# Patient Record
Sex: FEMALE | Race: WHITE | URBAN - METROPOLITAN AREA
[De-identification: names, ages, dates, MRNs, and addresses within clinical notes are randomized per-mention and may not be internally consistent; named-entity substitution may affect disease eponyms.]

---

## 2019-10-24 ENCOUNTER — HISTORICAL (OUTPATIENT)
Dept: PREADMISSION TESTING | Facility: HOSPITAL | Age: 69
End: 2019-10-24

## 2019-10-24 LAB
ABS NEUT (OLG): 3.76 X10(3)/MCL (ref 2.1–9.2)
ALBUMIN SERPL-MCNC: 4 GM/DL (ref 3.4–5)
ALBUMIN/GLOB SERPL: 1 RATIO (ref 1.1–2)
ALP SERPL-CCNC: 59 UNIT/L (ref 38–126)
ALT SERPL-CCNC: 30 UNIT/L (ref 12–78)
APTT PPP: 29.4 SECOND(S) (ref 24.2–33.9)
AST SERPL-CCNC: 23 UNIT/L (ref 15–37)
BASOPHILS # BLD AUTO: 0 X10(3)/MCL (ref 0–0.2)
BASOPHILS NFR BLD AUTO: 1 %
BILIRUB SERPL-MCNC: 0.3 MG/DL (ref 0.2–1)
BILIRUBIN DIRECT+TOT PNL SERPL-MCNC: 0.1 MG/DL (ref 0–0.5)
BILIRUBIN DIRECT+TOT PNL SERPL-MCNC: 0.2 MG/DL (ref 0–0.8)
BUN SERPL-MCNC: 15 MG/DL (ref 7–18)
CALCIUM SERPL-MCNC: 9.3 MG/DL (ref 8.5–10.1)
CHLORIDE SERPL-SCNC: 102 MMOL/L (ref 98–107)
CO2 SERPL-SCNC: 33 MMOL/L (ref 21–32)
CREAT SERPL-MCNC: 1 MG/DL (ref 0.55–1.02)
EOSINOPHIL # BLD AUTO: 0.2 X10(3)/MCL (ref 0–0.9)
EOSINOPHIL NFR BLD AUTO: 3 %
ERYTHROCYTE [DISTWIDTH] IN BLOOD BY AUTOMATED COUNT: 12.3 % (ref 11.5–17)
GLOBULIN SER-MCNC: 3.9 GM/DL (ref 2.4–3.5)
GLUCOSE SERPL-MCNC: 127 MG/DL (ref 74–106)
HCT VFR BLD AUTO: 38.9 % (ref 37–47)
HGB BLD-MCNC: 12.7 GM/DL (ref 12–16)
INR PPP: 1 (ref 0–1.3)
LYMPHOCYTES # BLD AUTO: 2.6 X10(3)/MCL (ref 0.6–4.6)
LYMPHOCYTES NFR BLD AUTO: 36 %
MCH RBC QN AUTO: 30 PG (ref 27–31)
MCHC RBC AUTO-ENTMCNC: 32.6 GM/DL (ref 33–36)
MCV RBC AUTO: 91.7 FL (ref 80–94)
MONOCYTES # BLD AUTO: 0.4 X10(3)/MCL (ref 0.1–1.3)
MONOCYTES NFR BLD AUTO: 5 %
NEUTROPHILS # BLD AUTO: 3.76 X10(3)/MCL (ref 2.1–9.2)
NEUTROPHILS NFR BLD AUTO: 54 %
PLATELET # BLD AUTO: 301 X10(3)/MCL (ref 130–400)
PMV BLD AUTO: 9.3 FL (ref 9.4–12.4)
POTASSIUM SERPL-SCNC: 3.6 MMOL/L (ref 3.5–5.1)
PROT SERPL-MCNC: 7.9 GM/DL (ref 6.4–8.2)
PROTHROMBIN TIME: 13.3 SECOND(S) (ref 12–14)
RBC # BLD AUTO: 4.24 X10(6)/MCL (ref 4.2–5.4)
SODIUM SERPL-SCNC: 139 MMOL/L (ref 136–145)
WBC # SPEC AUTO: 7 X10(3)/MCL (ref 4.5–11.5)

## 2019-11-08 ENCOUNTER — HISTORICAL (OUTPATIENT)
Dept: ADMINISTRATIVE | Facility: HOSPITAL | Age: 69
End: 2019-11-08

## 2024-04-04 ENCOUNTER — HOSPITAL ENCOUNTER (INPATIENT)
Facility: HOSPITAL | Age: 74
LOS: 3 days | Discharge: HOME | DRG: 641 | End: 2024-04-07
Attending: FAMILY MEDICINE | Admitting: INTERNAL MEDICINE
Payer: MEDICARE

## 2024-04-04 DIAGNOSIS — R07.9 CHEST PAIN: ICD-10-CM

## 2024-04-04 DIAGNOSIS — R07.89 CHEST TIGHTNESS: ICD-10-CM

## 2024-04-04 DIAGNOSIS — E87.1 HYPONATREMIA: Primary | ICD-10-CM

## 2024-04-04 PROBLEM — I10 HTN (HYPERTENSION): Status: ACTIVE | Noted: 2024-04-04

## 2024-04-04 PROBLEM — I48.0 PAROXYSMAL A-FIB: Status: ACTIVE | Noted: 2024-04-04

## 2024-04-04 PROBLEM — E03.9 HYPOTHYROIDISM: Status: ACTIVE | Noted: 2024-04-04

## 2024-04-04 LAB
ALBUMIN SERPL-MCNC: 5.1 G/DL (ref 3.4–5)
ALBUMIN/GLOB SERPL: 1.4 RATIO
ALP SERPL-CCNC: 64 UNIT/L (ref 50–144)
ALT SERPL-CCNC: 36 UNIT/L (ref 1–45)
ANION GAP SERPL CALC-SCNC: 10 MEQ/L (ref 2–13)
AST SERPL-CCNC: 45 UNIT/L (ref 14–36)
BASOPHILS # BLD AUTO: 0.02 X10(3)/MCL (ref 0.01–0.08)
BASOPHILS NFR BLD AUTO: 0.3 % (ref 0.1–1.2)
BILIRUB SERPL-MCNC: 0.7 MG/DL (ref 0–1)
BNP BLD-MCNC: 196 PG/ML (ref 0–124.9)
BUN SERPL-MCNC: 17 MG/DL (ref 7–20)
CALCIUM SERPL-MCNC: 10.1 MG/DL (ref 8.4–10.2)
CHLORIDE SERPL-SCNC: 76 MMOL/L (ref 98–110)
CO2 SERPL-SCNC: 29 MMOL/L (ref 21–32)
CREAT SERPL-MCNC: 1.04 MG/DL (ref 0.66–1.25)
CREAT/UREA NIT SERPL: 16 (ref 12–20)
EOSINOPHIL # BLD AUTO: 0.09 X10(3)/MCL (ref 0.04–0.36)
EOSINOPHIL NFR BLD AUTO: 1.2 % (ref 0.7–7)
ERYTHROCYTE [DISTWIDTH] IN BLOOD BY AUTOMATED COUNT: 12.1 % (ref 11–14.5)
GFR SERPLBLD CREATININE-BSD FMLA CKD-EPI: 56 MLS/MIN/1.73/M2
GLOBULIN SER-MCNC: 3.6 GM/DL (ref 2–3.9)
GLUCOSE SERPL-MCNC: 134 MG/DL (ref 70–115)
HCT VFR BLD AUTO: 37.6 % (ref 36–48)
HGB BLD-MCNC: 13.8 G/DL (ref 11.8–16)
IMM GRANULOCYTES # BLD AUTO: 0.02 X10(3)/MCL (ref 0–0.03)
IMM GRANULOCYTES NFR BLD AUTO: 0.3 % (ref 0–0.5)
LYMPHOCYTES # BLD AUTO: 2.98 X10(3)/MCL (ref 1.16–3.74)
LYMPHOCYTES NFR BLD AUTO: 39.7 % (ref 20–55)
MAGNESIUM SERPL-MCNC: 1.9 MG/DL (ref 1.8–2.4)
MCH RBC QN AUTO: 29.1 PG (ref 27–34)
MCHC RBC AUTO-ENTMCNC: 36.7 G/DL (ref 31–37)
MCV RBC AUTO: 79.3 FL (ref 79–99)
MONOCYTES # BLD AUTO: 0.65 X10(3)/MCL (ref 0.24–0.36)
MONOCYTES NFR BLD AUTO: 8.7 % (ref 4.7–12.5)
NEUTROPHILS # BLD AUTO: 3.74 X10(3)/MCL (ref 1.56–6.13)
NEUTROPHILS NFR BLD AUTO: 49.8 % (ref 37–73)
PLATELET # BLD AUTO: 386 X10(3)/MCL (ref 140–371)
PMV BLD AUTO: 8.9 FL (ref 9.4–12.4)
POTASSIUM SERPL-SCNC: 3.5 MMOL/L (ref 3.5–5.1)
PROT SERPL-MCNC: 8.7 GM/DL (ref 6.3–8.2)
RBC # BLD AUTO: 4.74 X10(6)/MCL (ref 4–5.1)
SODIUM SERPL-SCNC: 115 MMOL/L (ref 135–145)
TROPONIN I SERPL-MCNC: <0.012 NG/ML (ref 0–0.03)
TROPONIN I SERPL-MCNC: <0.012 NG/ML (ref 0–0.03)
WBC # SPEC AUTO: 7.5 X10(3)/MCL (ref 4–11.5)

## 2024-04-04 PROCEDURE — 83930 ASSAY OF BLOOD OSMOLALITY: CPT | Performed by: INTERNAL MEDICINE

## 2024-04-04 PROCEDURE — 11000001 HC ACUTE MED/SURG PRIVATE ROOM

## 2024-04-04 PROCEDURE — 25000003 PHARM REV CODE 250: Performed by: FAMILY MEDICINE

## 2024-04-04 PROCEDURE — 83935 ASSAY OF URINE OSMOLALITY: CPT | Performed by: INTERNAL MEDICINE

## 2024-04-04 PROCEDURE — 96374 THER/PROPH/DIAG INJ IV PUSH: CPT

## 2024-04-04 PROCEDURE — 96361 HYDRATE IV INFUSION ADD-ON: CPT

## 2024-04-04 PROCEDURE — 93010 ELECTROCARDIOGRAM REPORT: CPT | Mod: ,,, | Performed by: INTERNAL MEDICINE

## 2024-04-04 PROCEDURE — 99285 EMERGENCY DEPT VISIT HI MDM: CPT | Mod: 25

## 2024-04-04 PROCEDURE — 84300 ASSAY OF URINE SODIUM: CPT | Performed by: INTERNAL MEDICINE

## 2024-04-04 PROCEDURE — 83735 ASSAY OF MAGNESIUM: CPT | Performed by: FAMILY MEDICINE

## 2024-04-04 PROCEDURE — 63600175 PHARM REV CODE 636 W HCPCS: Performed by: FAMILY MEDICINE

## 2024-04-04 PROCEDURE — 84484 ASSAY OF TROPONIN QUANT: CPT | Performed by: FAMILY MEDICINE

## 2024-04-04 PROCEDURE — 63600175 PHARM REV CODE 636 W HCPCS: Performed by: INTERNAL MEDICINE

## 2024-04-04 PROCEDURE — 80053 COMPREHEN METABOLIC PANEL: CPT | Performed by: FAMILY MEDICINE

## 2024-04-04 PROCEDURE — 25000242 PHARM REV CODE 250 ALT 637 W/ HCPCS: Performed by: FAMILY MEDICINE

## 2024-04-04 PROCEDURE — 85025 COMPLETE CBC W/AUTO DIFF WBC: CPT | Performed by: FAMILY MEDICINE

## 2024-04-04 PROCEDURE — 83880 ASSAY OF NATRIURETIC PEPTIDE: CPT | Performed by: FAMILY MEDICINE

## 2024-04-04 PROCEDURE — 93005 ELECTROCARDIOGRAM TRACING: CPT

## 2024-04-04 RX ORDER — ACETAMINOPHEN 325 MG/1
650 TABLET ORAL EVERY 4 HOURS PRN
Status: DISCONTINUED | OUTPATIENT
Start: 2024-04-04 | End: 2024-04-07 | Stop reason: HOSPADM

## 2024-04-04 RX ORDER — OLMESARTAN MEDOXOMIL 5 MG/1
20 TABLET ORAL 2 TIMES DAILY
Status: ON HOLD | COMMUNITY
Start: 2024-02-15 | End: 2024-04-04

## 2024-04-04 RX ORDER — NITROGLYCERIN 0.4 MG/1
0.4 TABLET SUBLINGUAL EVERY 5 MIN PRN
Status: DISCONTINUED | OUTPATIENT
Start: 2024-04-04 | End: 2024-04-07 | Stop reason: HOSPADM

## 2024-04-04 RX ORDER — BUSPIRONE HYDROCHLORIDE 5 MG/1
5 TABLET ORAL DAILY
Status: ON HOLD | COMMUNITY
Start: 2024-04-01 | End: 2024-04-04

## 2024-04-04 RX ORDER — ASPIRIN 325 MG
325 TABLET ORAL
Status: COMPLETED | OUTPATIENT
Start: 2024-04-04 | End: 2024-04-04

## 2024-04-04 RX ORDER — AMLODIPINE BESYLATE 5 MG/1
2.5 TABLET ORAL NIGHTLY
Status: ON HOLD | COMMUNITY
Start: 2024-03-14 | End: 2024-04-07 | Stop reason: HOSPADM

## 2024-04-04 RX ORDER — SODIUM CHLORIDE 0.9 % (FLUSH) 0.9 %
10 SYRINGE (ML) INJECTION EVERY 12 HOURS PRN
Status: DISCONTINUED | OUTPATIENT
Start: 2024-04-04 | End: 2024-04-07 | Stop reason: HOSPADM

## 2024-04-04 RX ORDER — HYDROCHLOROTHIAZIDE 12.5 MG/1
25 TABLET ORAL DAILY
Status: CANCELLED | OUTPATIENT
Start: 2024-04-05

## 2024-04-04 RX ORDER — AMIODARONE HYDROCHLORIDE 200 MG/1
200 TABLET ORAL 2 TIMES DAILY
Status: DISCONTINUED | OUTPATIENT
Start: 2024-04-04 | End: 2024-04-06

## 2024-04-04 RX ORDER — HYDROCHLOROTHIAZIDE 25 MG/1
12.5 TABLET ORAL 2 TIMES DAILY
Status: ON HOLD | COMMUNITY
Start: 2024-03-08 | End: 2024-04-07 | Stop reason: HOSPADM

## 2024-04-04 RX ORDER — NITROGLYCERIN 0.4 MG/1
0.4 TABLET SUBLINGUAL
Status: COMPLETED | OUTPATIENT
Start: 2024-04-04 | End: 2024-04-04

## 2024-04-04 RX ORDER — ALPRAZOLAM 0.25 MG/1
0.25 TABLET ORAL NIGHTLY
COMMUNITY

## 2024-04-04 RX ORDER — AMLODIPINE BESYLATE 2.5 MG/1
2.5 TABLET ORAL NIGHTLY
Status: CANCELLED | OUTPATIENT
Start: 2024-04-04

## 2024-04-04 RX ORDER — LEVOTHYROXINE SODIUM 88 UG/1
88 TABLET ORAL
COMMUNITY

## 2024-04-04 RX ORDER — SODIUM CHLORIDE 9 MG/ML
1000 INJECTION, SOLUTION INTRAVENOUS
Status: DISCONTINUED | OUTPATIENT
Start: 2024-04-04 | End: 2024-04-04

## 2024-04-04 RX ORDER — NALOXONE HCL 0.4 MG/ML
0.02 VIAL (ML) INJECTION
Status: DISCONTINUED | OUTPATIENT
Start: 2024-04-04 | End: 2024-04-07 | Stop reason: HOSPADM

## 2024-04-04 RX ORDER — LOSARTAN POTASSIUM 25 MG/1
25 TABLET ORAL DAILY
Status: CANCELLED | OUTPATIENT
Start: 2024-04-05

## 2024-04-04 RX ORDER — LEVOTHYROXINE SODIUM 88 UG/1
88 TABLET ORAL
Status: DISCONTINUED | OUTPATIENT
Start: 2024-04-05 | End: 2024-04-07 | Stop reason: HOSPADM

## 2024-04-04 RX ORDER — METOPROLOL SUCCINATE 25 MG/1
25 TABLET, EXTENDED RELEASE ORAL NIGHTLY
Status: ON HOLD | COMMUNITY
Start: 2024-02-15 | End: 2024-04-07 | Stop reason: HOSPADM

## 2024-04-04 RX ORDER — BUSPIRONE HYDROCHLORIDE 5 MG/1
5 TABLET ORAL DAILY
Status: DISCONTINUED | OUTPATIENT
Start: 2024-04-05 | End: 2024-04-07 | Stop reason: HOSPADM

## 2024-04-04 RX ORDER — METOPROLOL SUCCINATE 25 MG/1
25 TABLET, EXTENDED RELEASE ORAL NIGHTLY
Status: CANCELLED | OUTPATIENT
Start: 2024-04-04

## 2024-04-04 RX ORDER — APIXABAN 5 MG/1
5 TABLET, FILM COATED ORAL 2 TIMES DAILY
COMMUNITY
Start: 2024-03-12

## 2024-04-04 RX ORDER — ONDANSETRON HYDROCHLORIDE 2 MG/ML
4 INJECTION, SOLUTION INTRAVENOUS EVERY 8 HOURS PRN
Status: DISCONTINUED | OUTPATIENT
Start: 2024-04-04 | End: 2024-04-07 | Stop reason: HOSPADM

## 2024-04-04 RX ORDER — ACETAMINOPHEN 325 MG/1
650 TABLET ORAL EVERY 8 HOURS PRN
Status: DISCONTINUED | OUTPATIENT
Start: 2024-04-04 | End: 2024-04-07 | Stop reason: HOSPADM

## 2024-04-04 RX ORDER — OLMESARTAN MEDOXOMIL 20 MG/1
20 TABLET ORAL 2 TIMES DAILY
Status: ON HOLD | COMMUNITY
End: 2024-04-07

## 2024-04-04 RX ORDER — HYDROMORPHONE HYDROCHLORIDE 1 MG/ML
0.5 INJECTION, SOLUTION INTRAMUSCULAR; INTRAVENOUS; SUBCUTANEOUS
Status: COMPLETED | OUTPATIENT
Start: 2024-04-04 | End: 2024-04-04

## 2024-04-04 RX ORDER — AMIODARONE HYDROCHLORIDE 200 MG/1
200 TABLET ORAL DAILY
Status: ON HOLD | COMMUNITY
Start: 2024-02-02 | End: 2024-04-07 | Stop reason: HOSPADM

## 2024-04-04 RX ADMIN — NITROGLYCERIN 0.4 MG: 0.4 TABLET SUBLINGUAL at 08:04

## 2024-04-04 RX ADMIN — NITROGLYCERIN 0.4 MG: 0.4 TABLET SUBLINGUAL at 07:04

## 2024-04-04 RX ADMIN — SODIUM CHLORIDE 1000 ML: 9 INJECTION, SOLUTION INTRAVENOUS at 07:04

## 2024-04-04 RX ADMIN — ONDANSETRON 4 MG: 2 INJECTION INTRAMUSCULAR; INTRAVENOUS at 11:04

## 2024-04-04 RX ADMIN — HYDROMORPHONE HYDROCHLORIDE 0.5 MG: 1 INJECTION, SOLUTION INTRAMUSCULAR; INTRAVENOUS; SUBCUTANEOUS at 08:04

## 2024-04-04 RX ADMIN — SODIUM CHLORIDE 1000 ML: 9 INJECTION, SOLUTION INTRAVENOUS at 08:04

## 2024-04-04 RX ADMIN — ASPIRIN 325 MG ORAL TABLET 325 MG: 325 PILL ORAL at 07:04

## 2024-04-04 NOTE — ED PROVIDER NOTES
Encounter Date: 4/4/2024       History     Chief Complaint   Patient presents with    Chest Pain     Pt presented to ED per POV with c/o midsternal chest pain starting this morning. Pt denies chest pain right now.      Patient presents for evaluation of chest pain.  Patient notes having chest pain since this afternoon.  Pain is moderate aching pain that has been episodic lasting hours and sometimes improving slightly.  Patient describes the pain as pressure-like.  Patient denies having fevers chills cough congestion.  Patient notes having a recent history of ablation for atrial flutter on March 18th.  Patient denies having any relieving features.  Patient also notes having recent adjustment in her blood pressure medicine and has been having periods of hypotension when taking the new medication.    The history is provided by the patient.     Review of patient's allergies indicates:  No Known Allergies  Past Medical History:   Diagnosis Date    A-fib     Hypertension      Past Surgical History:   Procedure Laterality Date    ABLATION OF DYSRHYTHMIC FOCUS       History reviewed. No pertinent family history.  Social History     Tobacco Use    Smoking status: Never    Smokeless tobacco: Never   Substance Use Topics    Alcohol use: Not Currently    Drug use: Never     Review of Systems   HENT: Negative.     Eyes: Negative.    Respiratory: Negative.     Cardiovascular:  Positive for chest pain.   Gastrointestinal: Negative.    Endocrine: Negative.    Genitourinary: Negative.    Musculoskeletal: Negative.    Skin: Negative.    Allergic/Immunologic: Negative.    Neurological: Negative.    Psychiatric/Behavioral: Negative.         Physical Exam     Initial Vitals [04/04/24 1840]   BP Pulse Resp Temp SpO2   (!) 187/78 67 20 97.8 °F (36.6 °C) 100 %      MAP       --         Physical Exam    Vitals reviewed.  Constitutional: She appears well-developed and well-nourished. She is not diaphoretic. No distress.   HENT:   Head:  Normocephalic and atraumatic.   Mouth/Throat: No oropharyngeal exudate.   Eyes: EOM are normal. Pupils are equal, round, and reactive to light. Right eye exhibits no discharge. Left eye exhibits no discharge.   Neck: Neck supple. No thyromegaly present. No tracheal deviation present.   Normal range of motion.  Cardiovascular:  Normal rate and regular rhythm.     Exam reveals no friction rub.       No murmur heard.  Pulmonary/Chest: Breath sounds normal. No stridor. No respiratory distress. She has no wheezes. She has no rhonchi.   Abdominal: Abdomen is soft. Bowel sounds are normal. She exhibits no distension. There is no abdominal tenderness. There is no rebound.   Musculoskeletal:         General: No tenderness or edema. Normal range of motion.      Cervical back: Normal range of motion and neck supple.     Neurological: She is alert and oriented to person, place, and time. She has normal reflexes. She displays normal reflexes. No cranial nerve deficit. GCS score is 15. GCS eye subscore is 4. GCS verbal subscore is 5. GCS motor subscore is 6.   Skin: Skin is warm. No rash noted. No erythema.   Psychiatric: She has a normal mood and affect.         ED Course   Procedures  Labs Reviewed   COMPREHENSIVE METABOLIC PANEL - Abnormal; Notable for the following components:       Result Value    Sodium Level 115 (*)     Chloride 76 (*)     Glucose Level 134 (*)     Protein Total 8.7 (*)     Albumin Level 5.1 (*)     Aspartate Aminotransferase 45 (*)     All other components within normal limits   NT-PRO NATRIURETIC PEPTIDE - Abnormal; Notable for the following components:    ProBNP 196.0 (*)     All other components within normal limits   CBC WITH DIFFERENTIAL - Abnormal; Notable for the following components:    Platelet 386 (*)     MPV 8.9 (*)     Mono # 0.65 (*)     All other components within normal limits   MAGNESIUM - Normal   TROPONIN I - Normal   TROPONIN I - Normal   CBC W/ AUTO DIFFERENTIAL    Narrative:     The  following orders were created for panel order CBC auto differential.  Procedure                               Abnormality         Status                     ---------                               -----------         ------                     CBC with Differential[[id number]]       Abnormal            Final result                 Please view results for these tests on the individual orders.     EKG Readings: (Independently Interpreted)   Initial Reading: No STEMI. ST Segments: Normal ST Segments. T Waves: Normal.   Normal sinus rhythm at 67 beats per minute.  Nonspecific ST-T changes present.       Imaging Results              X-Ray Chest 1 View (Final result)  Result time 04/04/24 20:22:30      Final result by Indra Alonso MD (04/04/24 20:22:30)                   Impression:      No radiographic evidence of an acute cardiopulmonary process.      Electronically signed by: Indra Alonso  Date:    04/04/2024  Time:    20:22               Narrative:    EXAMINATION:  XR CHEST 1 VIEW    CLINICAL HISTORY:  Chest pain, unspecified    COMPARISON:  None    FINDINGS:  Single AP chest radiograph is provided for evaluation.    Cardiac silhouette is normal in size.    Stable appearance of prominent interstitial lung markings.    No focal consolidation, pneumothorax, or pleural effusion.    No acute osseous findings.                                       Medications   nitroGLYCERIN SL tablet 0.4 mg (0.4 mg Sublingual Given 4/4/24 1926)   sodium chloride 0.9% bolus 1,000 mL 1,000 mL (1,000 mLs Intravenous New Bag 4/4/24 2055)   aspirin tablet 325 mg (325 mg Oral Given 4/4/24 1926)   sodium chloride 0.9% bolus 1,000 mL 1,000 mL (0 mLs Intravenous Stopped 4/4/24 2026)   HYDROmorphone injection 0.5 mg (0.5 mg Intravenous Given 4/4/24 2030)   nitroGLYCERIN SL tablet 0.4 mg (0.4 mg Sublingual Given 4/4/24 2034)     Medical Decision Making  Amount and/or Complexity of Data Reviewed  Labs: ordered.  Radiology: ordered.    Risk  OTC  drugs.  Prescription drug management.  Decision regarding hospitalization.                                      Clinical Impression:  Final diagnoses:  [R07.9] Chest pain  [E87.1] Hyponatremia (Primary)          ED Disposition Condition    Admit Stable                Fernando Bardales MD  04/04/24 4699

## 2024-04-05 LAB
ANION GAP SERPL CALC-SCNC: 5 MEQ/L (ref 2–13)
ANION GAP SERPL CALC-SCNC: 6 MEQ/L (ref 2–13)
ANION GAP SERPL CALC-SCNC: 7 MEQ/L (ref 2–13)
ANION GAP SERPL CALC-SCNC: 8 MEQ/L (ref 2–13)
BASOPHILS # BLD AUTO: 0.01 X10(3)/MCL (ref 0.01–0.08)
BASOPHILS NFR BLD AUTO: 0.2 % (ref 0.1–1.2)
BUN SERPL-MCNC: 10 MG/DL (ref 7–20)
BUN SERPL-MCNC: 11 MG/DL (ref 7–20)
CALCIUM SERPL-MCNC: 8.8 MG/DL (ref 8.4–10.2)
CALCIUM SERPL-MCNC: 9.3 MG/DL (ref 8.4–10.2)
CALCIUM SERPL-MCNC: 9.6 MG/DL (ref 8.4–10.2)
CALCIUM SERPL-MCNC: 9.7 MG/DL (ref 8.4–10.2)
CHLORIDE SERPL-SCNC: 84 MMOL/L (ref 98–110)
CHLORIDE SERPL-SCNC: 88 MMOL/L (ref 98–110)
CHLORIDE SERPL-SCNC: 89 MMOL/L (ref 98–110)
CHLORIDE SERPL-SCNC: 90 MMOL/L (ref 98–110)
CO2 SERPL-SCNC: 29 MMOL/L (ref 21–32)
CO2 SERPL-SCNC: 29 MMOL/L (ref 21–32)
CO2 SERPL-SCNC: 30 MMOL/L (ref 21–32)
CO2 SERPL-SCNC: 31 MMOL/L (ref 21–32)
CREAT SERPL-MCNC: 0.79 MG/DL (ref 0.66–1.25)
CREAT SERPL-MCNC: 0.82 MG/DL (ref 0.66–1.25)
CREAT SERPL-MCNC: 0.83 MG/DL (ref 0.66–1.25)
CREAT SERPL-MCNC: 0.84 MG/DL (ref 0.66–1.25)
CREAT/UREA NIT SERPL: 12 (ref 12–20)
CREAT/UREA NIT SERPL: 12 (ref 12–20)
CREAT/UREA NIT SERPL: 13 (ref 12–20)
CREAT/UREA NIT SERPL: 13 (ref 12–20)
EOSINOPHIL # BLD AUTO: 0.02 X10(3)/MCL (ref 0.04–0.36)
EOSINOPHIL NFR BLD AUTO: 0.4 % (ref 0.7–7)
ERYTHROCYTE [DISTWIDTH] IN BLOOD BY AUTOMATED COUNT: 12.1 % (ref 11–14.5)
GFR SERPLBLD CREATININE-BSD FMLA CKD-EPI: 73 MLS/MIN/1.73/M2
GFR SERPLBLD CREATININE-BSD FMLA CKD-EPI: 74 MLS/MIN/1.73/M2
GFR SERPLBLD CREATININE-BSD FMLA CKD-EPI: 75 MLS/MIN/1.73/M2
GFR SERPLBLD CREATININE-BSD FMLA CKD-EPI: 78 MLS/MIN/1.73/M2
GLUCOSE SERPL-MCNC: 105 MG/DL (ref 70–115)
GLUCOSE SERPL-MCNC: 108 MG/DL (ref 70–115)
GLUCOSE SERPL-MCNC: 109 MG/DL (ref 70–115)
GLUCOSE SERPL-MCNC: 128 MG/DL (ref 70–115)
HCT VFR BLD AUTO: 31.5 % (ref 36–48)
HGB BLD-MCNC: 11.6 G/DL (ref 11.8–16)
IMM GRANULOCYTES # BLD AUTO: 0.01 X10(3)/MCL (ref 0–0.03)
IMM GRANULOCYTES NFR BLD AUTO: 0.2 % (ref 0–0.5)
LYMPHOCYTES # BLD AUTO: 1.33 X10(3)/MCL (ref 1.16–3.74)
LYMPHOCYTES NFR BLD AUTO: 26.9 % (ref 20–55)
MCH RBC QN AUTO: 29.4 PG (ref 27–34)
MCHC RBC AUTO-ENTMCNC: 36.8 G/DL (ref 31–37)
MCV RBC AUTO: 79.7 FL (ref 79–99)
MONOCYTES # BLD AUTO: 0.37 X10(3)/MCL (ref 0.24–0.36)
MONOCYTES NFR BLD AUTO: 7.5 % (ref 4.7–12.5)
NEUTROPHILS # BLD AUTO: 3.21 X10(3)/MCL (ref 1.56–6.13)
NEUTROPHILS NFR BLD AUTO: 64.8 % (ref 37–73)
OHS QRS DURATION: 114 MS
OHS QTC CALCULATION: 460 MS
OSMOLALITY SERPL: 250 MOSM/KG (ref 280–300)
OSMOLALITY UR: 163 MOSM/KG (ref 300–1300)
PLATELET # BLD AUTO: 290 X10(3)/MCL (ref 140–371)
PMV BLD AUTO: 8.9 FL (ref 9.4–12.4)
POTASSIUM SERPL-SCNC: 3.4 MMOL/L (ref 3.5–5.1)
POTASSIUM SERPL-SCNC: 3.5 MMOL/L (ref 3.5–5.1)
POTASSIUM SERPL-SCNC: 3.5 MMOL/L (ref 3.5–5.1)
POTASSIUM SERPL-SCNC: 3.6 MMOL/L (ref 3.5–5.1)
RBC # BLD AUTO: 3.95 X10(6)/MCL (ref 4–5.1)
SODIUM SERPL-SCNC: 119 MMOL/L (ref 135–145)
SODIUM SERPL-SCNC: 124 MMOL/L (ref 135–145)
SODIUM SERPL-SCNC: 126 MMOL/L (ref 135–145)
SODIUM SERPL-SCNC: 127 MMOL/L (ref 135–145)
SODIUM UR-SCNC: 45 MMOL/L
TROPONIN I SERPL-MCNC: 0.01 NG/ML (ref 0–0.03)
TSH SERPL-ACNC: 0.7 UIU/ML (ref 0.36–3.74)
WBC # SPEC AUTO: 4.95 X10(3)/MCL (ref 4–11.5)

## 2024-04-05 PROCEDURE — 36415 COLL VENOUS BLD VENIPUNCTURE: CPT | Performed by: INTERNAL MEDICINE

## 2024-04-05 PROCEDURE — 85025 COMPLETE CBC W/AUTO DIFF WBC: CPT | Performed by: INTERNAL MEDICINE

## 2024-04-05 PROCEDURE — 25000003 PHARM REV CODE 250: Performed by: INTERNAL MEDICINE

## 2024-04-05 PROCEDURE — 84443 ASSAY THYROID STIM HORMONE: CPT | Performed by: INTERNAL MEDICINE

## 2024-04-05 PROCEDURE — 25000003 PHARM REV CODE 250: Performed by: FAMILY MEDICINE

## 2024-04-05 PROCEDURE — 93005 ELECTROCARDIOGRAM TRACING: CPT

## 2024-04-05 PROCEDURE — 93010 ELECTROCARDIOGRAM REPORT: CPT | Mod: ,,, | Performed by: INTERNAL MEDICINE

## 2024-04-05 PROCEDURE — 11000001 HC ACUTE MED/SURG PRIVATE ROOM

## 2024-04-05 PROCEDURE — 80048 BASIC METABOLIC PNL TOTAL CA: CPT | Performed by: INTERNAL MEDICINE

## 2024-04-05 PROCEDURE — 84484 ASSAY OF TROPONIN QUANT: CPT | Performed by: INTERNAL MEDICINE

## 2024-04-05 RX ORDER — LIDOCAINE HYDROCHLORIDE 20 MG/ML
10 SOLUTION OROPHARYNGEAL ONCE
Status: COMPLETED | OUTPATIENT
Start: 2024-04-05 | End: 2024-04-05

## 2024-04-05 RX ORDER — ALUMINUM HYDROXIDE, MAGNESIUM HYDROXIDE, AND SIMETHICONE 1200; 120; 1200 MG/30ML; MG/30ML; MG/30ML
15 SUSPENSION ORAL EVERY 6 HOURS
Status: DISCONTINUED | OUTPATIENT
Start: 2024-04-06 | End: 2024-04-05

## 2024-04-05 RX ORDER — ALUMINUM HYDROXIDE, MAGNESIUM HYDROXIDE, AND SIMETHICONE 1200; 120; 1200 MG/30ML; MG/30ML; MG/30ML
30 SUSPENSION ORAL ONCE
Status: COMPLETED | OUTPATIENT
Start: 2024-04-05 | End: 2024-04-05

## 2024-04-05 RX ORDER — ALPRAZOLAM 0.25 MG/1
0.25 TABLET ORAL NIGHTLY
Status: DISCONTINUED | OUTPATIENT
Start: 2024-04-05 | End: 2024-04-07 | Stop reason: HOSPADM

## 2024-04-05 RX ORDER — ALUMINUM HYDROXIDE, MAGNESIUM HYDROXIDE, AND SIMETHICONE 1200; 120; 1200 MG/30ML; MG/30ML; MG/30ML
15 SUSPENSION ORAL EVERY 6 HOURS PRN
Status: DISCONTINUED | OUTPATIENT
Start: 2024-04-05 | End: 2024-04-07 | Stop reason: HOSPADM

## 2024-04-05 RX ORDER — METOPROLOL SUCCINATE 25 MG/1
25 TABLET, EXTENDED RELEASE ORAL DAILY
Status: DISCONTINUED | OUTPATIENT
Start: 2024-04-05 | End: 2024-04-06

## 2024-04-05 RX ADMIN — APIXABAN 5 MG: 2.5 TABLET, FILM COATED ORAL at 08:04

## 2024-04-05 RX ADMIN — LIDOCAINE HYDROCHLORIDE 10 ML: 20 SOLUTION ORAL; TOPICAL at 11:04

## 2024-04-05 RX ADMIN — ALUMINUM HYDROXIDE, MAGNESIUM HYDROXIDE, AND SIMETHICONE 15 ML: 200; 200; 20 SUSPENSION ORAL at 09:04

## 2024-04-05 RX ADMIN — LEVOTHYROXINE SODIUM 88 MCG: 88 TABLET ORAL at 06:04

## 2024-04-05 RX ADMIN — APIXABAN 5 MG: 2.5 TABLET, FILM COATED ORAL at 09:04

## 2024-04-05 RX ADMIN — ALPRAZOLAM 0.25 MG: 0.25 TABLET ORAL at 09:04

## 2024-04-05 RX ADMIN — METOPROLOL SUCCINATE 25 MG: 25 TABLET, EXTENDED RELEASE ORAL at 06:04

## 2024-04-05 RX ADMIN — ALUMINUM HYDROXIDE, MAGNESIUM HYDROXIDE, AND SIMETHICONE 30 ML: 200; 200; 20 SUSPENSION ORAL at 11:04

## 2024-04-05 RX ADMIN — AMIODARONE HYDROCHLORIDE 200 MG: 200 TABLET ORAL at 08:04

## 2024-04-05 NOTE — ASSESSMENT & PLAN NOTE
- troponins negative x 2  - EKG no acute findings  Cardiology follow   Rec follow dr deejay esteban la   - likely non-cardiac pain

## 2024-04-05 NOTE — SUBJECTIVE & OBJECTIVE
Past Medical History:   Diagnosis Date    A-fib     Anxiety disorder, unspecified     Hypertension     Thyroid disease        Past Surgical History:   Procedure Laterality Date    ABLATION OF DYSRHYTHMIC FOCUS      CATARACT EXTRACTION         Review of patient's allergies indicates:  No Known Allergies    No current facility-administered medications on file prior to encounter.     Current Outpatient Medications on File Prior to Encounter   Medication Sig    ALPRAZolam (XANAX) 0.25 MG tablet Take 0.25 mg by mouth every evening.    amiodarone (PACERONE) 200 MG Tab Take 200 mg by mouth once daily.    ELIQUIS 5 mg Tab Take 5 mg by mouth 2 (two) times daily.    hydroCHLOROthiazide (HYDRODIURIL) 25 MG tablet Take 12.5 mg by mouth 2 (two) times daily.    levothyroxine (SYNTHROID) 88 MCG tablet Take 88 mcg by mouth before breakfast.    metoprolol succinate (TOPROL-XL) 25 MG 24 hr tablet Take 25 mg by mouth every evening.    olmesartan (BENICAR) 20 MG tablet Take 20 mg by mouth 2 (two) times a day.    amLODIPine (NORVASC) 5 MG tablet Take 2.5 mg by mouth nightly.     Family History    Reviewed and negative in relation to patient's condition        Tobacco Use    Smoking status: Former     Types: Cigarettes    Smokeless tobacco: Never   Substance and Sexual Activity    Alcohol use: Not Currently    Drug use: Never    Sexual activity: Yes     Review of Systems  Except as documented, all other systems are negative.      Objective:     Vital Signs (Most Recent):  Temp: 97.7 °F (36.5 °C) (04/05/24 0710)  Pulse: (!) 58 (04/05/24 0710)  Resp: 18 (04/05/24 0710)  BP: 124/60 (04/05/24 0710)  SpO2: 98 % (04/05/24 0710) Vital Signs (24h Range):  Temp:  [97.3 °F (36.3 °C)-98.4 °F (36.9 °C)] 97.7 °F (36.5 °C)  Pulse:  [58-70] 58  Resp:  [12-26] 18  SpO2:  [98 %-100 %] 98 %  BP: ()/(37-78) 124/60     Weight: 60.2 kg (132 lb 12.8 oz)  Body mass index is 23.52 kg/m².     Physical Exam     CONSTITUTIONAL: vitals as noted, alert, awake,  no distress  HEENT: No scleral icterus, oropharynx clear, tongue dry  RESPIRATORY: clear to auscultation  CVS: regular rate and rhythm  GASTROINTESTINAL: soft, non-tender, non-distended  NEURO: grossly normal exam, moves all extremities  HEM/LYMPH: no lymphadenopathy  PSYCH: alert and oriented x 3, normal affect  SKIN: no rashes noted, good skin turgor  EXT: no edema        Significant Labs: All pertinent labs within the past 24 hours have been reviewed.  BMP:   Recent Labs   Lab 04/04/24 1849 04/05/24 0158 04/05/24  1201   *   < > 127*   K 3.5   < > 3.5   CO2 29   < > 30   BUN 17.0   < > 10.0   CREATININE 1.04   < > 0.82   CALCIUM 10.1   < > 9.6   MG 1.90  --   --     < > = values in this interval not displayed.       CBC:   Recent Labs   Lab 04/04/24 1849 04/05/24  0158   WBC 7.50 4.95   HGB 13.8 11.6*   HCT 37.6 31.5*   * 290         Significant Imaging: I have reviewed all pertinent imaging results/findings within the past 24 hours.

## 2024-04-05 NOTE — SUBJECTIVE & OBJECTIVE
Past Medical History:   Diagnosis Date    A-fib     Anxiety disorder, unspecified     Hypertension     Thyroid disease        Past Surgical History:   Procedure Laterality Date    ABLATION OF DYSRHYTHMIC FOCUS      CATARACT EXTRACTION         Review of patient's allergies indicates:  No Known Allergies    No current facility-administered medications on file prior to encounter.     Current Outpatient Medications on File Prior to Encounter   Medication Sig    ALPRAZolam (XANAX) 0.25 MG tablet Take 0.25 mg by mouth every evening.    amiodarone (PACERONE) 200 MG Tab Take 200 mg by mouth once daily.    ELIQUIS 5 mg Tab Take 5 mg by mouth 2 (two) times daily.    hydroCHLOROthiazide (HYDRODIURIL) 25 MG tablet Take 12.5 mg by mouth 2 (two) times daily.    levothyroxine (SYNTHROID) 88 MCG tablet Take 88 mcg by mouth before breakfast.    metoprolol succinate (TOPROL-XL) 25 MG 24 hr tablet Take 25 mg by mouth every evening.    olmesartan (BENICAR) 20 MG tablet Take 20 mg by mouth 2 (two) times a day.    amLODIPine (NORVASC) 5 MG tablet Take 2.5 mg by mouth nightly.     Family History    None       Tobacco Use    Smoking status: Former     Types: Cigarettes    Smokeless tobacco: Never   Substance and Sexual Activity    Alcohol use: Not Currently    Drug use: Never    Sexual activity: Yes     Review of Systems   Constitutional: Positive for malaise/fatigue.   HENT: Negative.     Eyes: Negative.    Cardiovascular:  Positive for chest pain.   Respiratory: Negative.     Endocrine: Negative.    Hematologic/Lymphatic: Negative.    Skin: Negative.    Musculoskeletal:  Positive for arthritis.   Gastrointestinal: Negative.    Genitourinary: Negative.    Neurological:  Positive for weakness.   Psychiatric/Behavioral:  The patient has insomnia.    Allergic/Immunologic: Negative.      Objective:     Vital Signs (Most Recent):  Temp: 97.8 °F (36.6 °C) (04/05/24 1110)  Pulse: 61 (04/05/24 1110)  Resp: 18 (04/05/24 1110)  BP: 127/60 (04/05/24  1110)  SpO2: (!) 94 % (04/05/24 1110) Vital Signs (24h Range):  Temp:  [97.3 °F (36.3 °C)-98.4 °F (36.9 °C)] 97.8 °F (36.6 °C)  Pulse:  [58-70] 61  Resp:  [12-26] 18  SpO2:  [94 %-100 %] 94 %  BP: ()/(37-78) 127/60     Weight: 60.2 kg (132 lb 12.8 oz)  Body mass index is 23.52 kg/m².    SpO2: (!) 94 %         Intake/Output Summary (Last 24 hours) at 4/5/2024 1709  Last data filed at 4/5/2024 0920  Gross per 24 hour   Intake 720 ml   Output 400 ml   Net 320 ml       Lines/Drains/Airways       Peripheral Intravenous Line  Duration                  Peripheral IV - Single Lumen 04/04/24 1848 18 G Left Antecubital <1 day                     Physical Exam  Constitutional:       General: She is not in acute distress.  HENT:      Head: Normocephalic and atraumatic.      Nose: No rhinorrhea.      Mouth/Throat:      Mouth: Mucous membranes are moist.   Eyes:      Extraocular Movements: Extraocular movements intact.      Pupils: Pupils are equal, round, and reactive to light.   Cardiovascular:      Rate and Rhythm: Normal rate.   Pulmonary:      Effort: Pulmonary effort is normal.      Breath sounds: Normal breath sounds.   Abdominal:      Palpations: Abdomen is soft.      Tenderness: There is no abdominal tenderness.   Musculoskeletal:      Cervical back: Normal range of motion and neck supple.      Right lower leg: No edema.      Left lower leg: No edema.   Skin:     General: Skin is warm and dry.   Neurological:      General: No focal deficit present.      Mental Status: She is alert and oriented to person, place, and time.   Psychiatric:         Behavior: Behavior normal.          Significant Labs: CMP   Recent Labs   Lab 04/04/24  1849 04/05/24  0158 04/05/24  0558 04/05/24  0801 04/05/24  1201   *   < > 124* 126* 127*   K 3.5   < > 3.4* 3.6 3.5   CO2 29   < > 29 31 30   BUN 17.0   < > 10.0 10.0 10.0   CREATININE 1.04   < > 0.79 0.83 0.82   CALCIUM 10.1   < > 9.3 9.7 9.6   ALBUMIN 5.1*  --   --   --   --     BILITOT 0.7  --   --   --   --    ALKPHOS 64  --   --   --   --    AST 45*  --   --   --   --    ALT 36  --   --   --   --     < > = values in this interval not displayed.    and CBC   Recent Labs   Lab 04/04/24  1849 04/05/24  0158   WBC 7.50 4.95   HGB 13.8 11.6*   HCT 37.6 31.5*   * 290

## 2024-04-05 NOTE — ASSESSMENT & PLAN NOTE
- check serum osmolality, urine osmolality, urine sodium  - received 2L saline bolus and hold fluids for now  - BMP q4 with goal to increase serum sodium by 4-6 mEq/L for the next several hours  - neurochecks Q 6   Cont hold ivfs  Improve 126 this AM   Repeat morning labs

## 2024-04-05 NOTE — CONSULTS
[facility]-Med/Surg  Cardiology  Consult Note    Patient Name: Mirna Morrow  MRN: [id number]  Admission Date: 4/4/2024  Hospital Length of Stay: 1 days  Code Status: Full Code   Attending Provider: Dr Washington  Consulting Provider: Salty Singleton MD  Primary Care Physician: Teresa Valle NP  Principal Problem:Hyponatremia    Patient information was obtained from Patient, MD notes .     Consults  Subjective:     73-year-old female, personal history of atrial fibrillation, atrial flutter, ablation ×2, recent ablation with a month, chronic oral anticoagulation, hypothyroid, presented to emergency room for evaluation of none exertional substernal chest pain.  Chest pains mild to moderate severity, second set at time, exacerbation factors unclear.  She denies associated nausea, vomiting, diaphoresis,dyspnea, syncope.  EKG sinus, no acute changes.  Cardiac enzymes, troponins negative ×2. proBNP level is 196 Blood pressure is noted to be elevated 187/78 mmHg, chest x-ray no active lobar infiltrate. Patient tells me that her blood pressure varies a lot and that at times she adjusted her on own blood pressure medication.  Sodium is also noted to be abnormal, hyponatremia, 115.  She was given nitroglycerin, IV Dilaudid-drops of blood pressure-received 2 L She is being followed by Cardiology Dr. Barbosa  in [place].  Cardiology consultedfor patient's chest pain.    Chest pain  Personal history of atrial fibrillation, atrial flutter-prior ablation ×2, recent ablation within a month  Chronic oral anticoagulation  Hypothyroid-TSH 0.7  Hyponatremia-improved 115->  127  Mild hypokalemia  Hypertensive urgency  Essential hypertension      Past Medical History:   Diagnosis Date    A-fib     Anxiety disorder, unspecified     Hypertension     Thyroid disease        Past Surgical History:   Procedure Laterality Date    ABLATION OF DYSRHYTHMIC FOCUS      CATARACT EXTRACTION         Review of patient's allergies  indicates:  No Known Allergies    No current facility-administered medications on file prior to encounter.     Current Outpatient Medications on File Prior to Encounter   Medication Sig    ALPRAZolam (XANAX) 0.25 MG tablet Take 0.25 mg by mouth every evening.    amiodarone (PACERONE) 200 MG Tab Take 200 mg by mouth once daily.    ELIQUIS 5 mg Tab Take 5 mg by mouth 2 (two) times daily.    hydroCHLOROthiazide (HYDRODIURIL) 25 MG tablet Take 12.5 mg by mouth 2 (two) times daily.    levothyroxine (SYNTHROID) 88 MCG tablet Take 88 mcg by mouth before breakfast.    metoprolol succinate (TOPROL-XL) 25 MG 24 hr tablet Take 25 mg by mouth every evening.    olmesartan (BENICAR) 20 MG tablet Take 20 mg by mouth 2 (two) times a day.    amLODIPine (NORVASC) 5 MG tablet Take 2.5 mg by mouth nightly.     Family History    None       Tobacco Use    Smoking status: Former     Types: Cigarettes    Smokeless tobacco: Never   Substance and Sexual Activity    Alcohol use: Not Currently    Drug use: Never    Sexual activity: Yes     Review of Systems   Constitutional: Positive for malaise/fatigue.   HENT: Negative.     Eyes: Negative.    Cardiovascular:  Positive for chest pain.   Respiratory: Negative.     Endocrine: Negative.    Hematologic/Lymphatic: Negative.    Skin: Negative.    Musculoskeletal:  Positive for arthritis.   Gastrointestinal: Negative.    Genitourinary: Negative.    Neurological:  Positive for weakness.   Psychiatric/Behavioral:  The patient has insomnia.    Allergic/Immunologic: Negative.      Objective:     Vital Signs (Most Recent):  Temp: 97.8 °F (36.6 °C) (04/05/24 1110)  Pulse: 61 (04/05/24 1110)  Resp: 18 (04/05/24 1110)  BP: 127/60 (04/05/24 1110)  SpO2: (!) 94 % (04/05/24 1110) Vital Signs (24h Range):  Temp:  [97.3 °F (36.3 °C)-98.4 °F (36.9 °C)] 97.8 °F (36.6 °C)  Pulse:  [58-70] 61  Resp:  [12-26] 18  SpO2:  [94 %-100 %] 94 %  BP: ()/(37-78) 127/60     Weight: 60.2 kg (132 lb 12.8 oz)  Body mass  index is 23.52 kg/m².    SpO2: (!) 94 %         Intake/Output Summary (Last 24 hours) at 4/5/2024 1709  Last data filed at 4/5/2024 0920  Gross per 24 hour   Intake 720 ml   Output 400 ml   Net 320 ml       Lines/Drains/Airways       Peripheral Intravenous Line  Duration                  Peripheral IV - Single Lumen 04/04/24 1848 18 G Left Antecubital <1 day                     Physical Exam  Constitutional:       General: She is not in acute distress.  HENT:      Head: Normocephalic and atraumatic.      Nose: No rhinorrhea.      Mouth/Throat:      Mouth: Mucous membranes are moist.   Eyes:      Extraocular Movements: Extraocular movements intact.      Pupils: Pupils are equal, round, and reactive to light.   Cardiovascular:      Rate and Rhythm: Normal rate, no gallop, soft systolic murmur left sternal border, no JVD  Pulmonary:      Effort: Pulmonary effort is normal.      Breath sounds: Normal breath sounds.   Abdominal:      Palpations: Abdomen is soft.      Tenderness: There is no abdominal tenderness.   Musculoskeletal:      Cervical back: Normal range of motion and neck supple.      Right lower leg: No edema.      Left lower leg: No edema.   Skin:     General: Skin is warm and dry.   Neurological:      General: No focal deficit present.      Mental Status: She is alert and oriented to person, place, and time.   Psychiatric:         Behavior: Behavior normal.          Significant Labs: CMP   Recent Labs   Lab 04/04/24  1849 04/05/24  0158 04/05/24  0558 04/05/24  0801 04/05/24  1201   *   < > 124* 126* 127*   K 3.5   < > 3.4* 3.6 3.5   CO2 29   < > 29 31 30   BUN 17.0   < > 10.0 10.0 10.0   CREATININE 1.04   < > 0.79 0.83 0.82   CALCIUM 10.1   < > 9.3 9.7 9.6   ALBUMIN 5.1*  --   --   --   --    BILITOT 0.7  --   --   --   --    ALKPHOS 64  --   --   --   --    AST 45*  --   --   --   --    ALT 36  --   --   --   --     < > = values in this interval not displayed.    and CBC   Recent Labs   Lab  04/04/24  1849 04/05/24  0158   WBC 7.50 4.95   HGB 13.8 11.6*   HCT 37.6 31.5*   * 290     Troponin negative ×2  ProBNP level was 196  Chest x-ray no active lobar infiltrate, cardiac size normal  EKG-sinus incomplete right bundle branch block      Assessment and Plan:     Chest pain  Personal history of atrial fibrillation, atrial flutter-prior ablation ×2, recent ablation within a month  Chronic oral anticoagulation  Hypothyroid-TSH 0.7  Hyponatremia-improved 115->  127  Mild hypokalemia  Hypertensive urgency  Essential hypertension    Recommendation  Telemetry monitoring  BP monitorin - meds adjustment accordingly  Patient will continue amiodarone, beta-blockers, oral anticoagulation  Replace K   Post discharge follow up with her Cardiologist Dr. Barbosa further cardiac workup , stress test etc.    Appreciate Dr Washington's help / Consult            Salty Singleton MD  Cardiology   [facility]

## 2024-04-05 NOTE — HPI
Chief complaint: Chest pain    History given by: patient    HPI: 73 year old F patient with PMH of A-Fib (s/p ablations in February and March) presents to the ER with mid-sternal chest pain that started today at rest and BP of 187/78. Chest pain has since resolved. She has had labile BP at home and has had to frequently adjust her BP meds (amlodipine, metoprolol, HCTZ, olmesartan). Denies any urinary symptoms. Reports drinking four 16 oz water bottles a day.     I personally spoke with ED clinician regarding the case and reviewed their notes. I personally reviewed all imaging and lab findings as well as any prior medical records that were available within the chart prior to admission. Workup in the ER showed Na of 115 and /78. She was given NTG and IV dilaudid and BP dropped to 80/37. She received 2L saline bolus. She was admitted for further management.

## 2024-04-05 NOTE — PLAN OF CARE
04/05/24 1253   Discharge Assessment   Assessment Type Discharge Planning Assessment   Confirmed/corrected address, phone number and insurance Yes   Confirmed Demographics Correct on Facesheet   Source of Information patient   When was your last doctors appointment?   (March 2024)   Reason For Admission low sodium   People in Home spouse   Facility Arrived From: home   Do you expect to return to your current living situation? Yes   Do you have help at home or someone to help you manage your care at home? Yes   Who are your caregiver(s) and their phone number(s)?  Andrey Morrow [phone]   Prior to hospitilization cognitive status: Alert/Oriented   Current cognitive status: Alert/Oriented   Walking or Climbing Stairs Difficulty no   Dressing/Bathing Difficulty no   Home Layout Able to live on 1st floor   Equipment Currently Used at Home none   Readmission within 30 days? No   Patient currently being followed by outpatient case management? No   Do you currently have service(s) that help you manage your care at home? No   Do you take prescription medications? Yes   Do you have prescription coverage? Yes   Coverage MCR   Do you have any problems affording any of your prescribed medications? No   Is the patient taking medications as prescribed? yes   Who is going to help you get home at discharge?    How do you get to doctors appointments? car, drives self;family or friend will provide   Are you on dialysis? No   Do you take coumadin? No   Discharge Plan A Home   Discharge Plan B Home Health   DME Needed Upon Discharge  none   Discharge Plan discussed with: Patient;Spouse/sig other   Transition of Care Barriers None   Physical Activity   On average, how many days per week do you engage in moderate to strenuous exercise (like a brisk walk)? 0 days   On average, how many minutes do you engage in exercise at this level? 0 min   Financial Resource Strain   How hard is it for you to pay for the very basics  like food, housing, medical care, and heating? Not hard   Housing Stability   In the last 12 months, was there a time when you were not able to pay the mortgage or rent on time? N   In the last 12 months, how many places have you lived? 1   In the last 12 months, was there a time when you did not have a steady place to sleep or slept in a shelter (including now)? N   Transportation Needs   In the past 12 months, has lack of transportation kept you from medical appointments or from getting medications? no   In the past 12 months, has lack of transportation kept you from meetings, work, or from getting things needed for daily living? No   Food Insecurity   Within the past 12 months, you worried that your food would run out before you got the money to buy more. Never true   Within the past 12 months, the food you bought just didn't last and you didn't have money to get more. Never true   Stress   Do you feel stress - tense, restless, nervous, or anxious, or unable to sleep at night because your mind is troubled all the time - these days? Only a littl   Social Connections   In a typical week, how many times do you talk on the phone with family, friends, or neighbors? More than 3   How often do you get together with friends or relatives? More than 3   How often do you attend Amish or Spiritism services? More than 4   Do you belong to any clubs or organizations such as Amish groups, unions, fraternal or athletic groups, or school groups? No   How often do you attend meetings of the clubs or organizations you belong to? Never   Are you , , , , never , or living with a partner?    Alcohol Use   Q1: How often do you have a drink containing alcohol? Never   Q2: How many drinks containing alcohol do you have on a typical day when you are drinking? None   Q3: How often do you have six or more drinks on one occasion? Never

## 2024-04-05 NOTE — SUBJECTIVE & OBJECTIVE
Past Medical History:   Diagnosis Date    A-fib     Hypertension        Past Surgical History:   Procedure Laterality Date    ABLATION OF DYSRHYTHMIC FOCUS         Review of patient's allergies indicates:  No Known Allergies    No current facility-administered medications on file prior to encounter.     Current Outpatient Medications on File Prior to Encounter   Medication Sig    amiodarone (PACERONE) 200 MG Tab Take 200 mg by mouth 2 (two) times daily.    amLODIPine (NORVASC) 5 MG tablet Take 2.5 mg by mouth nightly.    busPIRone (BUSPAR) 5 MG Tab Take 5 mg by mouth once daily.    ELIQUIS 5 mg Tab Take 5 mg by mouth 2 (two) times daily.    hydroCHLOROthiazide (HYDRODIURIL) 25 MG tablet Take 25 mg by mouth once daily.    metoprolol succinate (TOPROL-XL) 25 MG 24 hr tablet Take 25 mg by mouth every evening.    olmesartan (BENICAR) 5 MG Tab Take 5 mg by mouth 2 (two) times a day.     Family History    Reviewed and negative in relation to patient's condition        Tobacco Use    Smoking status: Never    Smokeless tobacco: Never   Substance and Sexual Activity    Alcohol use: Not Currently    Drug use: Never    Sexual activity: Not on file     Review of Systems  Except as documented, all other systems are negative.      Objective:     Vital Signs (Most Recent):  Temp: 97.8 °F (36.6 °C) (04/04/24 1840)  Pulse: 60 (04/04/24 2111)  Resp: 17 (04/04/24 2111)  BP: 117/65 (04/04/24 2111)  SpO2: 100 % (04/04/24 2111) Vital Signs (24h Range):  Temp:  [97.8 °F (36.6 °C)] 97.8 °F (36.6 °C)  Pulse:  [58-70] 60  Resp:  [12-26] 17  SpO2:  [98 %-100 %] 100 %  BP: ()/(37-78) 117/65     Weight: 59 kg (130 lb)  Body mass index is 23.03 kg/m².     Physical Exam     CONSTITUTIONAL: vitals as noted, alert, awake, no distress  HEENT: No scleral icterus, oropharynx clear, tongue dry  RESPIRATORY: clear to auscultation  CVS: regular rate and rhythm  GASTROINTESTINAL: soft, non-tender, non-distended  NEURO: grossly normal exam, moves all  extremities  HEM/LYMPH: no lymphadenopathy  PSYCH: alert and oriented x 3, normal affect  SKIN: no rashes noted, good skin turgor  EXT: no edema        Significant Labs: All pertinent labs within the past 24 hours have been reviewed.  BMP:   Recent Labs   Lab 04/04/24  1849   *   K 3.5   CO2 29   BUN 17.0   CREATININE 1.04   CALCIUM 10.1   MG 1.90     CBC:   Recent Labs   Lab 04/04/24  1849   WBC 7.50   HGB 13.8   HCT 37.6   *       Significant Imaging: I have reviewed all pertinent imaging results/findings within the past 24 hours.

## 2024-04-05 NOTE — PLAN OF CARE
Problem: Adult Inpatient Plan of Care  Goal: Plan of Care Review  Outcome: Ongoing, Progressing  Goal: Patient-Specific Goal (Individualized)  Outcome: Ongoing, Progressing  Goal: Absence of Hospital-Acquired Illness or Injury  Outcome: Ongoing, Progressing  Goal: Optimal Comfort and Wellbeing  Outcome: Ongoing, Progressing  Goal: Readiness for Transition of Care  Outcome: Ongoing, Progressing     Problem: Electrolyte Imbalance  Goal: Electrolyte Balance  Outcome: Ongoing, Progressing     Problem: Chest Pain  Goal: Resolution of Chest Pain Symptoms  Outcome: Ongoing, Progressing     Problem: Fall Injury Risk  Goal: Absence of Fall and Fall-Related Injury  Outcome: Ongoing, Progressing

## 2024-04-05 NOTE — H&P
[facility] Medicine  History & Physical    Patient Name: Mirna Morrow  MRN: [id number]  Patient Class: IP- Inpatient  Admission Date: 4/4/2024  Attending Physician: Kamala Steinberg MD   Primary Care Provider: Teresa Valle NP         Patient information was obtained from patient and ER records.     Subjective:     Principal Problem:<principal problem not specified>    Chief Complaint:   Chief Complaint   Patient presents with    Chest Pain     Pt presented to ED per POV with c/o midsternal chest pain starting this morning. Pt denies chest pain right now.         HPI: Chief complaint: Chest pain    History given by: patient    HPI: 73 year old F patient with PMH of A-Fib (s/p ablations in February and March) presents to the ER with mid-sternal chest pain that started today at rest and BP of 187/78. Chest pain has since resolved. She has had labile BP at home and has had to frequently adjust her BP meds (amlodipine, metoprolol, HCTZ, olmesartan). Denies any urinary symptoms. Reports drinking four 16 oz water bottles a day.     I personally spoke with ED clinician regarding the case and reviewed their notes. I personally reviewed all imaging and lab findings as well as any prior medical records that were available within the chart prior to admission. Workup in the ER showed Na of 115 and /78. She was given NTG and IV dilaudid and BP dropped to 80/37. She received 2L saline bolus. She was admitted for further management.     Past Medical History:   Diagnosis Date    A-fib     Hypertension        Past Surgical History:   Procedure Laterality Date    ABLATION OF DYSRHYTHMIC FOCUS         Review of patient's allergies indicates:  No Known Allergies    No current facility-administered medications on file prior to encounter.     Current Outpatient Medications on File Prior to Encounter   Medication Sig    amiodarone (PACERONE) 200 MG Tab Take 200 mg by mouth 2 (two)  times daily.    amLODIPine (NORVASC) 5 MG tablet Take 2.5 mg by mouth nightly.    busPIRone (BUSPAR) 5 MG Tab Take 5 mg by mouth once daily.    ELIQUIS 5 mg Tab Take 5 mg by mouth 2 (two) times daily.    hydroCHLOROthiazide (HYDRODIURIL) 25 MG tablet Take 25 mg by mouth once daily.    metoprolol succinate (TOPROL-XL) 25 MG 24 hr tablet Take 25 mg by mouth every evening.    olmesartan (BENICAR) 5 MG Tab Take 5 mg by mouth 2 (two) times a day.     Family History    Reviewed and negative in relation to patient's condition        Tobacco Use    Smoking status: Never    Smokeless tobacco: Never   Substance and Sexual Activity    Alcohol use: Not Currently    Drug use: Never    Sexual activity: Not on file     Review of Systems  Except as documented, all other systems are negative.      Objective:     Vital Signs (Most Recent):  Temp: 97.8 °F (36.6 °C) (04/04/24 1840)  Pulse: 60 (04/04/24 2111)  Resp: 17 (04/04/24 2111)  BP: 117/65 (04/04/24 2111)  SpO2: 100 % (04/04/24 2111) Vital Signs (24h Range):  Temp:  [97.8 °F (36.6 °C)] 97.8 °F (36.6 °C)  Pulse:  [58-70] 60  Resp:  [12-26] 17  SpO2:  [98 %-100 %] 100 %  BP: ()/(37-78) 117/65     Weight: 59 kg (130 lb)  Body mass index is 23.03 kg/m².     Physical Exam     CONSTITUTIONAL: vitals as noted, alert, awake, no distress  HEENT: No scleral icterus, oropharynx clear, tongue dry  RESPIRATORY: clear to auscultation  CVS: regular rate and rhythm  GASTROINTESTINAL: soft, non-tender, non-distended  NEURO: grossly normal exam, moves all extremities  HEM/LYMPH: no lymphadenopathy  PSYCH: alert and oriented x 3, normal affect  SKIN: no rashes noted, good skin turgor  EXT: no edema        Significant Labs: All pertinent labs within the past 24 hours have been reviewed.  BMP:   Recent Labs   Lab 04/04/24 1849   *   K 3.5   CO2 29   BUN 17.0   CREATININE 1.04   CALCIUM 10.1   MG 1.90     CBC:   Recent Labs   Lab 04/04/24  1849   WBC 7.50   HGB 13.8   HCT 37.6   *        Significant Imaging: I have reviewed all pertinent imaging results/findings within the past 24 hours.  Assessment/Plan:     Hyponatremia  - check serum osmolality, urine osmolality, urine sodium  - received 2L saline bolus and hold fluids for now  - BMP q4 with goal to increase serum sodium by 4-6 mEq/L for the next several hours  - neurochecks Q 6     Chest pain  - troponins negative x 2  - likely non-cardiac pain    Hypothyroidism  - continue levothyroxine  - check TSH    Paroxysmal A-fib  - s/p ablation    HTN (hypertension)  - hold home meds      VTE Risk Mitigation (From admission, onward)      None          Service was provided using HIPAA compliant web platform using SOC for audio/visual equipment.   Patient Location: Hospital  Provider Location: Home ([place], [place])  Participants on call: bedside RN, patient  Consent was obtained, and the patient was seen with nurse assisting from the bedside.                    Kamala Steinberg MD  Department of Hospital Medicine  [facility]

## 2024-04-05 NOTE — HOSPITAL COURSE
04/05/2024  Feel better today   Sodium improve  Chest discomfort  Troponin negative  EKG no acute worrisome change  Cardio follow  GI cocktail x 1 now  04/06/2024  Sodium improve 129 today   BP stable   HR 48-56 bpm overnight  Amiodarone/metoprolol held this AM  Feels better overall  Maalox help with indigestion

## 2024-04-05 NOTE — ASSESSMENT & PLAN NOTE
- check serum osmolality, urine osmolality, urine sodium  - received 2L saline bolus and hold fluids for now  - BMP q4 with goal to increase serum sodium by 4-6 mEq/L for the next several hours  - neurochecks Q 6

## 2024-04-05 NOTE — ED NOTES
Success  A new connect request was successfully created for:  Mirna Morrow  : 1950  MRN: [id number]  ConnectID: [id number]  REASON:  ED Admit Discussion  ACUITY:  30 Minutes  SUBMITTED:  2024 21:20 CDT  CLOSE

## 2024-04-06 LAB
ANION GAP SERPL CALC-SCNC: 4 MEQ/L (ref 2–13)
BASOPHILS # BLD AUTO: 0.04 X10(3)/MCL (ref 0.01–0.08)
BASOPHILS NFR BLD AUTO: 0.8 % (ref 0.1–1.2)
BUN SERPL-MCNC: 9 MG/DL (ref 7–20)
CALCIUM SERPL-MCNC: 9.9 MG/DL (ref 8.4–10.2)
CHLORIDE SERPL-SCNC: 92 MMOL/L (ref 98–110)
CO2 SERPL-SCNC: 33 MMOL/L (ref 21–32)
CREAT SERPL-MCNC: 0.91 MG/DL (ref 0.66–1.25)
CREAT/UREA NIT SERPL: 10 (ref 12–20)
EOSINOPHIL # BLD AUTO: 0.13 X10(3)/MCL (ref 0.04–0.36)
EOSINOPHIL NFR BLD AUTO: 2.7 % (ref 0.7–7)
ERYTHROCYTE [DISTWIDTH] IN BLOOD BY AUTOMATED COUNT: 12.2 % (ref 11–14.5)
GFR SERPLBLD CREATININE-BSD FMLA CKD-EPI: 66 MLS/MIN/1.73/M2
GLUCOSE SERPL-MCNC: 90 MG/DL (ref 70–115)
HCT VFR BLD AUTO: 34.4 % (ref 36–48)
HGB BLD-MCNC: 12.3 G/DL (ref 11.8–16)
IMM GRANULOCYTES # BLD AUTO: 0.01 X10(3)/MCL (ref 0–0.03)
IMM GRANULOCYTES NFR BLD AUTO: 0.2 % (ref 0–0.5)
LYMPHOCYTES # BLD AUTO: 1.72 X10(3)/MCL (ref 1.16–3.74)
LYMPHOCYTES NFR BLD AUTO: 35.5 % (ref 20–55)
MAGNESIUM SERPL-MCNC: 2.2 MG/DL (ref 1.8–2.4)
MCH RBC QN AUTO: 29.4 PG (ref 27–34)
MCHC RBC AUTO-ENTMCNC: 35.8 G/DL (ref 31–37)
MCV RBC AUTO: 82.1 FL (ref 79–99)
MONOCYTES # BLD AUTO: 0.44 X10(3)/MCL (ref 0.24–0.36)
MONOCYTES NFR BLD AUTO: 9.1 % (ref 4.7–12.5)
NEUTROPHILS # BLD AUTO: 2.5 X10(3)/MCL (ref 1.56–6.13)
NEUTROPHILS NFR BLD AUTO: 51.7 % (ref 37–73)
NRBC BLD AUTO-RTO: 0 %
PLATELET # BLD AUTO: 322 X10(3)/MCL (ref 140–371)
PMV BLD AUTO: 9.1 FL (ref 9.4–12.4)
POTASSIUM SERPL-SCNC: 3.7 MMOL/L (ref 3.5–5.1)
RBC # BLD AUTO: 4.19 X10(6)/MCL (ref 4–5.1)
SODIUM SERPL-SCNC: 129 MMOL/L (ref 135–145)
WBC # SPEC AUTO: 4.84 X10(3)/MCL (ref 4–11.5)

## 2024-04-06 PROCEDURE — 80048 BASIC METABOLIC PNL TOTAL CA: CPT | Performed by: FAMILY MEDICINE

## 2024-04-06 PROCEDURE — 25000003 PHARM REV CODE 250: Performed by: INTERNAL MEDICINE

## 2024-04-06 PROCEDURE — 25000003 PHARM REV CODE 250: Performed by: FAMILY MEDICINE

## 2024-04-06 PROCEDURE — 83735 ASSAY OF MAGNESIUM: CPT | Performed by: FAMILY MEDICINE

## 2024-04-06 PROCEDURE — 36415 COLL VENOUS BLD VENIPUNCTURE: CPT | Performed by: INTERNAL MEDICINE

## 2024-04-06 PROCEDURE — 11000001 HC ACUTE MED/SURG PRIVATE ROOM

## 2024-04-06 PROCEDURE — 85025 COMPLETE CBC W/AUTO DIFF WBC: CPT | Performed by: INTERNAL MEDICINE

## 2024-04-06 RX ORDER — POTASSIUM CHLORIDE 20 MEQ/1
40 TABLET, EXTENDED RELEASE ORAL ONCE
Status: COMPLETED | OUTPATIENT
Start: 2024-04-06 | End: 2024-04-06

## 2024-04-06 RX ORDER — POLYETHYLENE GLYCOL 3350 17 G/17G
17 POWDER, FOR SOLUTION ORAL DAILY
Status: DISCONTINUED | OUTPATIENT
Start: 2024-04-06 | End: 2024-04-07 | Stop reason: HOSPADM

## 2024-04-06 RX ORDER — OLMESARTAN MEDOXOMIL 20 MG/1
20 TABLET ORAL DAILY
Status: DISCONTINUED | OUTPATIENT
Start: 2024-04-06 | End: 2024-04-07 | Stop reason: HOSPADM

## 2024-04-06 RX ORDER — PANTOPRAZOLE SODIUM 40 MG/1
40 TABLET, DELAYED RELEASE ORAL DAILY
Status: DISCONTINUED | OUTPATIENT
Start: 2024-04-06 | End: 2024-04-07 | Stop reason: HOSPADM

## 2024-04-06 RX ORDER — DOCUSATE SODIUM 100 MG/1
100 CAPSULE, LIQUID FILLED ORAL 2 TIMES DAILY
Status: DISCONTINUED | OUTPATIENT
Start: 2024-04-06 | End: 2024-04-07 | Stop reason: HOSPADM

## 2024-04-06 RX ADMIN — APIXABAN 5 MG: 2.5 TABLET, FILM COATED ORAL at 09:04

## 2024-04-06 RX ADMIN — BUSPIRONE HYDROCHLORIDE 5 MG: 5 TABLET ORAL at 08:04

## 2024-04-06 RX ADMIN — APIXABAN 5 MG: 2.5 TABLET, FILM COATED ORAL at 08:04

## 2024-04-06 RX ADMIN — DOCUSATE SODIUM 100 MG: 100 CAPSULE, LIQUID FILLED ORAL at 04:04

## 2024-04-06 RX ADMIN — POLYETHYLENE GLYCOL 3350 17 G: 17 POWDER, FOR SOLUTION ORAL at 04:04

## 2024-04-06 RX ADMIN — PANTOPRAZOLE SODIUM 40 MG: 40 TABLET, DELAYED RELEASE ORAL at 12:04

## 2024-04-06 RX ADMIN — LEVOTHYROXINE SODIUM 88 MCG: 88 TABLET ORAL at 05:04

## 2024-04-06 RX ADMIN — POTASSIUM CHLORIDE 40 MEQ: 1500 TABLET, EXTENDED RELEASE ORAL at 10:04

## 2024-04-06 RX ADMIN — ALPRAZOLAM 0.25 MG: 0.25 TABLET ORAL at 09:04

## 2024-04-06 RX ADMIN — OLMESARTAN MEDOXOMIL 20 MG: 20 TABLET, FILM COATED ORAL at 08:04

## 2024-04-06 NOTE — SUBJECTIVE & OBJECTIVE
Past Medical History:   Diagnosis Date    A-fib     Anxiety disorder, unspecified     Hypertension     Thyroid disease        Past Surgical History:   Procedure Laterality Date    ABLATION OF DYSRHYTHMIC FOCUS      CATARACT EXTRACTION         Review of patient's allergies indicates:  No Known Allergies    No current facility-administered medications on file prior to encounter.     Current Outpatient Medications on File Prior to Encounter   Medication Sig    ALPRAZolam (XANAX) 0.25 MG tablet Take 0.25 mg by mouth every evening.    amiodarone (PACERONE) 200 MG Tab Take 200 mg by mouth once daily.    ELIQUIS 5 mg Tab Take 5 mg by mouth 2 (two) times daily.    hydroCHLOROthiazide (HYDRODIURIL) 25 MG tablet Take 12.5 mg by mouth 2 (two) times daily.    levothyroxine (SYNTHROID) 88 MCG tablet Take 88 mcg by mouth before breakfast.    metoprolol succinate (TOPROL-XL) 25 MG 24 hr tablet Take 25 mg by mouth every evening.    olmesartan (BENICAR) 20 MG tablet Take 20 mg by mouth 2 (two) times a day.    amLODIPine (NORVASC) 5 MG tablet Take 2.5 mg by mouth nightly.     Family History    Reviewed and negative in relation to patient's condition        Tobacco Use    Smoking status: Former     Types: Cigarettes    Smokeless tobacco: Never   Substance and Sexual Activity    Alcohol use: Not Currently    Drug use: Never    Sexual activity: Yes     Review of Systems  Except as documented, all other systems are negative.      Objective:     Vital Signs (Most Recent):  Temp: 97.7 °F (36.5 °C) (04/06/24 1115)  Pulse: (!) 53 (04/06/24 1115)  Resp: 14 (04/06/24 1115)  BP: (!) 122/59 (04/06/24 1115)  SpO2: 98 % (04/06/24 1115) Vital Signs (24h Range):  Temp:  [97.7 °F (36.5 °C)-99.1 °F (37.3 °C)] 97.7 °F (36.5 °C)  Pulse:  [49-69] 53  Resp:  [14-18] 14  SpO2:  [94 %-98 %] 98 %  BP: (110-141)/(53-71) 122/59     Weight: 60 kg (132 lb 4.8 oz)  Body mass index is 23.44 kg/m².     Physical Exam     CONSTITUTIONAL: vitals as noted, alert,  awake, no distress  HEENT: No scleral icterus, oropharynx clear, tongue dry  RESPIRATORY: clear to auscultation  CVS: regular rate and rhythm  GASTROINTESTINAL: soft, non-tender, non-distended  NEURO: grossly normal exam, moves all extremities  HEM/LYMPH: no lymphadenopathy  PSYCH: alert and oriented x 3, normal affect  SKIN: no rashes noted, good skin turgor  EXT: no edema        Significant Labs: All pertinent labs within the past 24 hours have been reviewed.  BMP:   Recent Labs   Lab 04/06/24  0607   *   K 3.7   CO2 33*   BUN 9.0   CREATININE 0.91   CALCIUM 9.9   MG 2.20       CBC:   Recent Labs   Lab 04/04/24  1849 04/05/24  0158 04/06/24  0607   WBC 7.50 4.95 4.84   HGB 13.8 11.6* 12.3   HCT 37.6 31.5* 34.4*   * 290 322         Significant Imaging: I have reviewed all pertinent imaging results/findings within the past 24 hours.

## 2024-04-06 NOTE — PROGRESS NOTES
[facility]  Cardiology  Progress Note    Patient Name: Mirna Morrow  MRN: [id number]  Admission Date: 4/4/2024  Hospital Length of Stay: 2 days  Code Status: Full Code   Attending Physician: Monroe Washington MD   Primary Care Physician: Teresa Valle NP  Expected Discharge Date: 4/7/2024  Principal Problem:Hyponatremia    Subjective:         73-year-old female, personal history of atrial fibrillation, atrial flutter, ablation ×2, recent ablation with a month, chronic oral anticoagulation, hypothyroid, presented to emergency room for evaluation of none exertional substernal chest pain.  Chest pains mild to moderate severity, second set at time, exacerbation factors unclear.  She denies associated nausea, vomiting, diaphoresis,dyspnea, syncope.  EKG sinus, no acute changes.  Cardiac enzymes, troponins negative ×2. proBNP level is 196 Blood pressure is noted to be elevated 187/78 mmHg, chest x-ray no active lobar infiltrate. Patient tells me that her blood pressure varies a lot and that at times she adjusted her on own blood pressure medication.  Sodium is also noted to be abnormal, hyponatremia, 115.  She was given nitroglycerin, IV Dilaudid-drops of blood pressure-received 2 L She is being followed by Cardiology Dr. Barbosa  in [place].  Cardiology consultedfor patient's chest pain.    Overall she feels much better  HR occasionally dropped in 40/50's  Amiodarone, metoprolol held  Sodium 127 - upward trend     Chest pain  Personal history of atrial fibrillation, atrial flutter-prior ablation ×2, recent ablation within a month  Chronic oral anticoagulation  Hypothyroid-TSH 0.7  Hyponatremia-improved 115->  127  Mild hypokalemia  Hypertensive urgency  Essential hypertension       Objective:     Vital Signs (Most Recent):  Temp: 97.7 °F (36.5 °C) (04/06/24 1115)  Pulse: (!) 53 (04/06/24 1115)  Resp: 14 (04/06/24 1115)  BP: (!) 122/59 (04/06/24 1115)  SpO2: 98 % (04/06/24 1115) Vital  Signs (24h Range):  Temp:  [97.7 °F (36.5 °C)-99.1 °F (37.3 °C)] 97.7 °F (36.5 °C)  Pulse:  [49-69] 53  Resp:  [14-18] 14  SpO2:  [94 %-98 %] 98 %  BP: (110-141)/(53-71) 122/59     Weight: 60 kg (132 lb 4.8 oz)  Body mass index is 23.44 kg/m².     SpO2: 98 %         Intake/Output Summary (Last 24 hours) at 4/6/2024 1238  Last data filed at 4/6/2024 0628  Gross per 24 hour   Intake 780 ml   Output --   Net 780 ml          Physical Exam  Constitutional:       General: She is not in acute distress.  HENT:      Head: Normocephalic and atraumatic.      Nose: No rhinorrhea.      Mouth/Throat:      Mouth: Mucous membranes are moist.   Eyes:      Extraocular Movements: Extraocular movements intact.      Pupils: Pupils are equal, round, and reactive to light.   Cardiovascular:      Rate and Rhythm: Normal rate, no gallop, soft systolic murmur left sternal border, no JVD  Pulmonary:      Effort: Pulmonary effort is normal.      Breath sounds: Normal breath sounds.   Abdominal:      Palpations: Abdomen is soft.      Tenderness: There is no abdominal tenderness.   Musculoskeletal:      Cervical back: Normal range of motion and neck supple.      Right lower leg: No edema.      Left lower leg: No edema.   Skin:     General: Skin is warm and dry.   Neurological:      General: No focal deficit present.      Mental Status: She is alert and oriented to person, place, and time.   Psychiatric:         Behavior: Behavior normal.        Significant Labs: CMP   Recent Labs   Lab 04/04/24  1849 04/05/24  0158 04/05/24  0801 04/05/24  1201 04/06/24  0607   *   < > 126* 127* 129*   K 3.5   < > 3.6 3.5 3.7   CO2 29   < > 31 30 33*   BUN 17.0   < > 10.0 10.0 9.0   CREATININE 1.04   < > 0.83 0.82 0.91   CALCIUM 10.1   < > 9.7 9.6 9.9   ALBUMIN 5.1*  --   --   --   --    BILITOT 0.7  --   --   --   --    ALKPHOS 64  --   --   --   --    AST 45*  --   --   --   --    ALT 36  --   --   --   --     < > = values in this interval not displayed.     and CBC   Recent Labs   Lab 04/04/24  1849 04/05/24  0158 04/06/24  0607   WBC 7.50 4.95 4.84   HGB 13.8 11.6* 12.3   HCT 37.6 31.5* 34.4*   * 290 322     Troponin negative ×2  ProBNP level was 196  Chest x-ray no active lobar infiltrate, cardiac size normal  EKG-sinus incomplete right bundle branch block         Assessment and Plan:       Chest pain  Personal history of atrial fibrillation, atrial flutter-prior ablation ×2, recent ablation within a month  Chronic oral anticoagulation  Intermittent bradycardia   Hypothyroid-TSH 0.7  Hyponatremia-improved 115->  127  Mild hypokalemia  Hypertensive urgency  Essential hypertension     Amiodarone, metoprolol held   Continue daily Eliquis, K supplement  Resume Benicar   Primary team correcting hyponatremia  Post discharge follow up with her Cardiologist Dr. Barbosa      Appreciate Dr Washington's help / Consult           Salty Singleton MD  Cardiology  [facility]

## 2024-04-06 NOTE — SUBJECTIVE & OBJECTIVE
ROS  Objective:     Vital Signs (Most Recent):  Temp: 97.7 °F (36.5 °C) (04/06/24 1115)  Pulse: (!) 53 (04/06/24 1115)  Resp: 14 (04/06/24 1115)  BP: (!) 122/59 (04/06/24 1115)  SpO2: 98 % (04/06/24 1115) Vital Signs (24h Range):  Temp:  [97.7 °F (36.5 °C)-99.1 °F (37.3 °C)] 97.7 °F (36.5 °C)  Pulse:  [49-69] 53  Resp:  [14-18] 14  SpO2:  [94 %-98 %] 98 %  BP: (110-141)/(53-71) 122/59     Weight: 60 kg (132 lb 4.8 oz)  Body mass index is 23.44 kg/m².     SpO2: 98 %         Intake/Output Summary (Last 24 hours) at 4/6/2024 1238  Last data filed at 4/6/2024 0628  Gross per 24 hour   Intake 780 ml   Output --   Net 780 ml       Lines/Drains/Airways       Peripheral Intravenous Line  Duration                  Peripheral IV - Single Lumen 04/04/24 1848 18 G Left Antecubital 1 day                       Physical Exam       Significant Labs: CMP   Recent Labs   Lab 04/04/24 1849 04/05/24 0158 04/05/24  0801 04/05/24  1201 04/06/24  0607   *   < > 126* 127* 129*   K 3.5   < > 3.6 3.5 3.7   CO2 29   < > 31 30 33*   BUN 17.0   < > 10.0 10.0 9.0   CREATININE 1.04   < > 0.83 0.82 0.91   CALCIUM 10.1   < > 9.7 9.6 9.9   ALBUMIN 5.1*  --   --   --   --    BILITOT 0.7  --   --   --   --    ALKPHOS 64  --   --   --   --    AST 45*  --   --   --   --    ALT 36  --   --   --   --     < > = values in this interval not displayed.    and CBC   Recent Labs   Lab 04/04/24 1849 04/05/24 0158 04/06/24  0607   WBC 7.50 4.95 4.84   HGB 13.8 11.6* 12.3   HCT 37.6 31.5* 34.4*   * 290 322

## 2024-04-06 NOTE — ASSESSMENT & PLAN NOTE
- troponins negative x 2  - EKG no acute findings  Cardiology follow   Rec follow dr deejay esteban la   - likely non-cardiac pain  resolved

## 2024-04-06 NOTE — PROGRESS NOTES
[facility] Medicine  Progress Note    Patient Name: Mirna [place]  MRN: [id number]  Patient Class: IP- Inpatient   Admission Date: 4/4/2024  Length of Stay: 2 days  Attending Physician: Monroe Washington MD  Primary Care Provider: Teresa Valle NP        Subjective:     Principal Problem:Hyponatremia        HPI:  Chief complaint: Chest pain    History given by: patient    HPI: 73 year old F patient with PMH of A-Fib (s/p ablations in February and March) presents to the ER with mid-sternal chest pain that started today at rest and BP of 187/78. Chest pain has since resolved. She has had labile BP at home and has had to frequently adjust her BP meds (amlodipine, metoprolol, HCTZ, olmesartan). Denies any urinary symptoms. Reports drinking four 16 oz water bottles a day.     I personally spoke with ED clinician regarding the case and reviewed their notes. I personally reviewed all imaging and lab findings as well as any prior medical records that were available within the chart prior to admission. Workup in the ER showed Na of 115 and /78. She was given NTG and IV dilaudid and BP dropped to 80/37. She received 2L saline bolus. She was admitted for further management.     Overview/Hospital Course:  04/05/2024  Feel better today   Sodium improve  Chest discomfort  Troponin negative  EKG no acute worrisome change  Cardio follow  GI cocktail x 1 now  04/06/2024  Sodium improve 129 today   BP stable   HR 48-56 bpm overnight  Amiodarone/metoprolol held this AM  Feels better overall  Maalox help with indigestion    Past Medical History:   Diagnosis Date    A-fib     Anxiety disorder, unspecified     Hypertension     Thyroid disease        Past Surgical History:   Procedure Laterality Date    ABLATION OF DYSRHYTHMIC FOCUS      CATARACT EXTRACTION         Review of patient's allergies indicates:  No Known Allergies    No current facility-administered medications on file prior to  encounter.     Current Outpatient Medications on File Prior to Encounter   Medication Sig    ALPRAZolam (XANAX) 0.25 MG tablet Take 0.25 mg by mouth every evening.    amiodarone (PACERONE) 200 MG Tab Take 200 mg by mouth once daily.    ELIQUIS 5 mg Tab Take 5 mg by mouth 2 (two) times daily.    hydroCHLOROthiazide (HYDRODIURIL) 25 MG tablet Take 12.5 mg by mouth 2 (two) times daily.    levothyroxine (SYNTHROID) 88 MCG tablet Take 88 mcg by mouth before breakfast.    metoprolol succinate (TOPROL-XL) 25 MG 24 hr tablet Take 25 mg by mouth every evening.    olmesartan (BENICAR) 20 MG tablet Take 20 mg by mouth 2 (two) times a day.    amLODIPine (NORVASC) 5 MG tablet Take 2.5 mg by mouth nightly.     Family History    Reviewed and negative in relation to patient's condition        Tobacco Use    Smoking status: Former     Types: Cigarettes    Smokeless tobacco: Never   Substance and Sexual Activity    Alcohol use: Not Currently    Drug use: Never    Sexual activity: Yes     Review of Systems  Except as documented, all other systems are negative.      Objective:     Vital Signs (Most Recent):  Temp: 97.7 °F (36.5 °C) (04/06/24 1115)  Pulse: (!) 53 (04/06/24 1115)  Resp: 14 (04/06/24 1115)  BP: (!) 122/59 (04/06/24 1115)  SpO2: 98 % (04/06/24 1115) Vital Signs (24h Range):  Temp:  [97.7 °F (36.5 °C)-99.1 °F (37.3 °C)] 97.7 °F (36.5 °C)  Pulse:  [49-69] 53  Resp:  [14-18] 14  SpO2:  [94 %-98 %] 98 %  BP: (110-141)/(53-71) 122/59     Weight: 60 kg (132 lb 4.8 oz)  Body mass index is 23.44 kg/m².     Physical Exam     CONSTITUTIONAL: vitals as noted, alert, awake, no distress  HEENT: No scleral icterus, oropharynx clear, tongue dry  RESPIRATORY: clear to auscultation  CVS: regular rate and rhythm  GASTROINTESTINAL: soft, non-tender, non-distended  NEURO: grossly normal exam, moves all extremities  HEM/LYMPH: no lymphadenopathy  PSYCH: alert and oriented x 3, normal affect  SKIN: no rashes noted, good skin turgor  EXT: no  edema        Significant Labs: All pertinent labs within the past 24 hours have been reviewed.  BMP:   Recent Labs   Lab 04/06/24  0607   *   K 3.7   CO2 33*   BUN 9.0   CREATININE 0.91   CALCIUM 9.9   MG 2.20       CBC:   Recent Labs   Lab 04/04/24  1849 04/05/24  0158 04/06/24  0607   WBC 7.50 4.95 4.84   HGB 13.8 11.6* 12.3   HCT 37.6 31.5* 34.4*   * 290 322         Significant Imaging: I have reviewed all pertinent imaging results/findings within the past 24 hours.    Assessment/Plan:      * Hyponatremia  - check serum osmolality, urine osmolality, urine sodium  - received 2L saline bolus and hold fluids for now  - BMP q4 with goal to increase serum sodium by 4-6 mEq/L for the next several hours  - neurochecks Q 6   Cont hold ivfs  Improve 129  Will continue to monitor   Repeat morning labs     Chest pain  - troponins negative x 2  - EKG no acute findings  Cardiology follow   Rec follow dr deejay tovar   - likely non-cardiac pain  resolved    Hypothyroidism  - continue levothyroxine  - check TSH    Paroxysmal A-fib  - s/p ablation  - spoke with dr jung  Bradycardic overnight and this am  Will hold amiodarone  Give metoprolol if HR increase > 100    HTN (hypertension)  - hold home meds      VTE Risk Mitigation (From admission, onward)           Ordered     apixaban tablet 5 mg  2 times daily         04/04/24 2204     IP VTE HIGH RISK PATIENT  Once         04/04/24 2204     Place sequential compression device  Until discontinued         04/04/24 2204     Reason for No Pharmacological VTE Prophylaxis  Once        Question:  Reasons:  Answer:  Already adequately anticoagulated on oral Anticoagulants    04/04/24 2204                    Discharge Planning   ZACHARY: 4/7/2024     Code Status: Full Code   Is the patient medically ready for discharge?:     Reason for patient still in hospital (select all that apply): Patient trending condition, Laboratory test, and Treatment  Discharge Plan A: Home                   Monroe Washington MD  Department of Hospital Medicine   [facility]

## 2024-04-06 NOTE — ASSESSMENT & PLAN NOTE
- s/p ablation  - spoke with dr jung  Bradycardic overnight and this am  Will hold amiodarone  Give metoprolol if HR increase > 100

## 2024-04-06 NOTE — ASSESSMENT & PLAN NOTE
- check serum osmolality, urine osmolality, urine sodium  - received 2L saline bolus and hold fluids for now  - BMP q4 with goal to increase serum sodium by 4-6 mEq/L for the next several hours  - neurochecks Q 6   Cont hold ivfs  Improve 129  Will continue to monitor   Repeat morning labs

## 2024-04-07 VITALS
BODY MASS INDEX: 22.73 KG/M2 | OXYGEN SATURATION: 98 % | TEMPERATURE: 98 F | HEART RATE: 54 BPM | HEIGHT: 63 IN | SYSTOLIC BLOOD PRESSURE: 115 MMHG | DIASTOLIC BLOOD PRESSURE: 68 MMHG | WEIGHT: 128.31 LBS | RESPIRATION RATE: 18 BRPM

## 2024-04-07 LAB
ANION GAP SERPL CALC-SCNC: 7 MEQ/L (ref 2–13)
BUN SERPL-MCNC: 11 MG/DL (ref 7–20)
CALCIUM SERPL-MCNC: 9.2 MG/DL (ref 8.4–10.2)
CHLORIDE SERPL-SCNC: 97 MMOL/L (ref 98–110)
CO2 SERPL-SCNC: 27 MMOL/L (ref 21–32)
CREAT SERPL-MCNC: 0.83 MG/DL (ref 0.66–1.25)
CREAT/UREA NIT SERPL: 13 (ref 12–20)
GFR SERPLBLD CREATININE-BSD FMLA CKD-EPI: 74 MLS/MIN/1.73/M2
GLUCOSE SERPL-MCNC: 85 MG/DL (ref 70–115)
MAGNESIUM SERPL-MCNC: 2.2 MG/DL (ref 1.8–2.4)
OHS QRS DURATION: 108 MS
OHS QTC CALCULATION: 446 MS
POTASSIUM SERPL-SCNC: 4 MMOL/L (ref 3.5–5.1)
SODIUM SERPL-SCNC: 131 MMOL/L (ref 135–145)

## 2024-04-07 PROCEDURE — 83735 ASSAY OF MAGNESIUM: CPT | Performed by: FAMILY MEDICINE

## 2024-04-07 PROCEDURE — 25000003 PHARM REV CODE 250: Performed by: INTERNAL MEDICINE

## 2024-04-07 PROCEDURE — 80048 BASIC METABOLIC PNL TOTAL CA: CPT | Performed by: FAMILY MEDICINE

## 2024-04-07 PROCEDURE — 36415 COLL VENOUS BLD VENIPUNCTURE: CPT | Performed by: FAMILY MEDICINE

## 2024-04-07 PROCEDURE — 25000003 PHARM REV CODE 250: Performed by: FAMILY MEDICINE

## 2024-04-07 RX ORDER — OLMESARTAN MEDOXOMIL 20 MG/1
20 TABLET ORAL DAILY
Start: 2024-04-07

## 2024-04-07 RX ORDER — PANTOPRAZOLE SODIUM 40 MG/1
40 TABLET, DELAYED RELEASE ORAL DAILY
Qty: 90 TABLET | Refills: 3 | Status: SHIPPED | OUTPATIENT
Start: 2024-04-07 | End: 2025-04-07

## 2024-04-07 RX ADMIN — LEVOTHYROXINE SODIUM 88 MCG: 88 TABLET ORAL at 05:04

## 2024-04-07 RX ADMIN — POLYETHYLENE GLYCOL 3350 17 G: 17 POWDER, FOR SOLUTION ORAL at 10:04

## 2024-04-07 RX ADMIN — APIXABAN 5 MG: 2.5 TABLET, FILM COATED ORAL at 10:04

## 2024-04-07 RX ADMIN — OLMESARTAN MEDOXOMIL 20 MG: 20 TABLET, FILM COATED ORAL at 09:04

## 2024-04-07 RX ADMIN — BUSPIRONE HYDROCHLORIDE 5 MG: 5 TABLET ORAL at 10:04

## 2024-04-07 RX ADMIN — PANTOPRAZOLE SODIUM 40 MG: 40 TABLET, DELAYED RELEASE ORAL at 10:04

## 2024-04-07 RX ADMIN — DOCUSATE SODIUM 100 MG: 100 CAPSULE, LIQUID FILLED ORAL at 10:04

## 2024-04-07 NOTE — DISCHARGE SUMMARY
Hospital Medicine  Discharge Summary    Patient Name: Mirna [place]  MRN: [id number]  Admit Date: 4/4/2024  Discharge Date:  4/7/2024  Status: IP- Inpatient   Length of Stay: 3      PHYSICIANS   Admitting Physician: Kamala Steinberg MD  Discharging Physician: Monroe Washington MD.  Primary Care Physician: Teresa Valle NP        DISCHARGE DIAGNOSES   Hyponatremia  improve     Chest pain    resolved     Hypothyroidism  - continue levothyroxine       Paroxysmal A-fib  - s/p ablation  - spoke with dr erich Jimenez overnight and this am  Will hold amiodarone/metoprolol     HTN (hypertension)  - cont olmesartan       PROCEDURES   * No surgery found *         HOSPITAL COURSE    Sodium improved with IVFs. Held diuretics.  Cardio eval consult for bradycardia- rec holding amiodraone and metoprolol.  BP stable now slightly elevated. Plan restart olmesartan, cont eliquis for atrial fibrillation  F/u cardiology [facility] dr villalba       STATUS  Improved, Stable    DISPOSITION  Discharge to home     DIET  Cardiac     ACTIVITY  As tolerated      FOLLOW-UP         DISCHARGE MEDICATION RECONCILIATION        Medication List        START taking these medications      pantoprazole 40 MG tablet  Commonly known as: PROTONIX  Take 1 tablet (40 mg total) by mouth once daily.            CHANGE how you take these medications      olmesartan 20 MG tablet  Commonly known as: BENICAR  Take 1 tablet (20 mg total) by mouth once daily.  What changed: when to take this            CONTINUE taking these medications      ALPRAZolam 0.25 MG tablet  Commonly known as: XANAX     ELIQUIS 5 mg Tab  Generic drug: apixaban     levothyroxine 88 MCG tablet  Commonly known as: SYNTHROID            STOP taking these medications      amiodarone 200 MG Tab  Commonly known as: PACERONE     amLODIPine 5 MG tablet  Commonly known as: NORVASC     hydroCHLOROthiazide 25 MG tablet  Commonly known as: HYDRODIURIL     metoprolol succinate 25 MG 24 hr  "tablet  Commonly known as: TOPROL-XL               Where to Get Your Medications        These medications were sent to [facility] - [place], [place] - [address]  [address] [address]      Phone: [phone]   pantoprazole 40 MG tablet       Information about where to get these medications is not yet available    Ask your nurse or doctor about these medications  olmesartan 20 MG tablet           PHYSICAL EXAM   VITALS: T 97.6 °F (36.4 °C)   /68   P (!) 54   RR 18   O2 98 %    Physical Exam  Vitals reviewed.   HENT:      Head: Normocephalic.   Cardiovascular:      Rate and Rhythm: Normal rate.   Pulmonary:      Effort: Pulmonary effort is normal.   Neurological:      Mental Status: She is alert.              DIAGNOSITCS   CBC:   Recent Labs   Lab 04/04/24 1849 04/05/24 0158 04/06/24  0607   WBC 7.50 4.95 4.84   HGB 13.8 11.6* 12.3   HCT 37.6 31.5* 34.4*   * 290 322       CMP:   Recent Labs   Lab 04/04/24 1849 04/05/24 0158 04/05/24  1201 04/06/24  0607 04/07/24  0510   CALCIUM 10.1   < > 9.6 9.9 9.2   ALBUMIN 5.1*  --   --   --   --    *   < > 127* 129* 131*   K 3.5   < > 3.5 3.7 4.0   CO2 29   < > 30 33* 27   BUN 17.0   < > 10.0 9.0 11.0   CREATININE 1.04   < > 0.82 0.91 0.83   ALKPHOS 64  --   --   --   --    ALT 36  --   --   --   --    AST 45*  --   --   --   --    BILITOT 0.7  --   --   --   --    MG 1.90  --   --  2.20 2.20    < > = values in this interval not displayed.     Estimated Creatinine Clearance: 48.4 mL/min (based on SCr of 0.83 mg/dL).    Labs within the past 24 hours have been reviewed.     COAG:  No results for input(s): "APTT", "INR", "PTT" in the last 168 hours.    CARDIAC ENZYMES:   Recent Labs     04/04/24 1849 04/04/24  2045 04/05/24  0158   TROPONINI <0.012 <0.012 0.012        No results for input(s): "AMYLASE", "LIPASE" in the last 168 hours.    No results for input(s): "POCTGLUCOSE" in the last 72 hours.     Microbiology Results (last 7 days) " "      ** No results found for the last 168 hours. **             No results for input(s): "CHOL", "TRIG", "HDL", "LDL" in the last 72 hours. No results found for: "LABA1C", "HGBA1C"     X-Ray Chest 1 View    Result Date: 4/4/2024  EXAMINATION: XR CHEST 1 VIEW CLINICAL HISTORY: Chest pain, unspecified COMPARISON: None FINDINGS: Single AP chest radiograph is provided for evaluation. Cardiac silhouette is normal in size. Stable appearance of prominent interstitial lung markings. No focal consolidation, pneumothorax, or pleural effusion. No acute osseous findings.     No radiographic evidence of an acute cardiopulmonary process. Electronically signed by: Indra Alonso Date:    04/04/2024 Time:    20:22      N/A     Patient screened for food insecurity, housing instability, transportation needs, utility difficulties, and interpersonal safety. No needs identified.    Discharge time: 33 minutes         Monroe Washington MD  [facility] Medicine      "

## 2024-04-07 NOTE — NURSING
During bedside shift report, patient appears anxious, voices c/o tension related to elevating blood pressure. Patient states that her blood pressure will only continue to rise if we do not treat it now. Patient's home medications held per physician's orders this morning due to bradycardia. Current B/P 160/90, Pulse 59. Patient requesting to notify MD regarding elevation of blood pressure, patient voices that she is afraid she will have a stroke if we do not treat her blood pressure. Dr. Singleton notified of patient's concerns and current vital signs. MD voices understanding and new order received for patient to resume home medication Olmesartan Medoxomil 20mg Daily with first dose this evening.

## 2024-04-07 NOTE — PLAN OF CARE
Problem: Adult Inpatient Plan of Care  Goal: Plan of Care Review  Outcome: Met  Goal: Patient-Specific Goal (Individualized)  Outcome: Met  Goal: Absence of Hospital-Acquired Illness or Injury  Outcome: Met  Goal: Optimal Comfort and Wellbeing  Outcome: Met  Goal: Readiness for Transition of Care  Outcome: Met     Problem: Electrolyte Imbalance  Goal: Electrolyte Balance  Outcome: Met     Problem: Chest Pain  Goal: Resolution of Chest Pain Symptoms  Outcome: Met     Problem: Fall Injury Risk  Goal: Absence of Fall and Fall-Related Injury  Outcome: Met

## 2024-04-07 NOTE — SUBJECTIVE & OBJECTIVE
ROS  Objective:     Vital Signs (Most Recent):  Temp: 97.6 °F (36.4 °C) (04/07/24 0720)  Pulse: (!) 54 (04/07/24 0720)  Resp: 18 (04/07/24 0720)  BP: 115/68 (04/07/24 0720)  SpO2: 98 % (04/07/24 0720) Vital Signs (24h Range):  Temp:  [97.4 °F (36.3 °C)-98.3 °F (36.8 °C)] 97.6 °F (36.4 °C)  Pulse:  [53-74] 54  Resp:  [14-19] 18  SpO2:  [94 %-99 %] 98 %  BP: (115-160)/(58-90) 115/68     Weight: 58.2 kg (128 lb 4.9 oz)  Body mass index is 22.73 kg/m².     SpO2: 98 %         Intake/Output Summary (Last 24 hours) at 4/7/2024 1048  Last data filed at 4/6/2024 2100  Gross per 24 hour   Intake 240 ml   Output --   Net 240 ml       Lines/Drains/Airways       Peripheral Intravenous Line  Duration                  Peripheral IV - Single Lumen 04/04/24 1848 18 G Left Antecubital 2 days                       Physical Exam       Significant Labs: CMP   Recent Labs   Lab 04/05/24  1201 04/06/24  0607 04/07/24  0510   * 129* 131*   K 3.5 3.7 4.0   CO2 30 33* 27   BUN 10.0 9.0 11.0   CREATININE 0.82 0.91 0.83   CALCIUM 9.6 9.9 9.2    and CBC   Recent Labs   Lab 04/06/24  0607   WBC 4.84   HGB 12.3   HCT 34.4*

## 2024-04-07 NOTE — PROGRESS NOTES
[facility]  [facility]    Patient Name: Mirna [place]row  MRN: [id number]  Admission Date: 4/4/2024  Hospital Length of Stay: 3 days  Code Status: Full Code   Attending Physician: Monroe Washington MD   Primary Care Physician: Teresa Valle NP  Expected Discharge Date: 4/7/2024  Principal Problem:Hyponatremia    Subjective:           73-year-old female, personal history of atrial fibrillation, atrial flutter, ablation ×2, recent ablation with a month, chronic oral anticoagulation, hypothyroid, presented to emergency room for evaluation of none exertional substernal chest pain.  Chest pains mild to moderate severity, second set at time, exacerbation factors unclear.  She denies associated nausea, vomiting, diaphoresis,dyspnea, syncope.  EKG sinus, no acute changes.  Cardiac enzymes, troponins negative ×2. proBNP level is 196 Blood pressure is noted to be elevated 187/78 mmHg, chest x-ray no active lobar infiltrate. Patient tells me that her blood pressure varies a lot and that at times she adjusted her on own blood pressure medication.  Sodium is also noted to be abnormal, hyponatremia, 115.  She was given nitroglycerin, IV Dilaudid-drops of blood pressure-received 2 L She is being followed by Cardiology Dr. Barbosa  in [place].  Cardiology consultedfor patient's chest pain.     Benicar resumed - BP controlled  HR much improve - amiodarone , metoprolol held  Sodium 131  No problem last night     Chest pain  Personal history of atrial fibrillation, atrial flutter-prior ablation ×2, recent ablation within a month  Chronic oral anticoagulation  Hypothyroid-TSH 0.7  Hyponatremia-improved 115->  127  Mild hypokalemia  Hypertensive urgency  Essential hypertension    Objective:     Vital Signs (Most Recent):  Temp: 97.6 °F (36.4 °C) (04/07/24 0720)  Pulse: (!) 54 (04/07/24 0720)  Resp: 18 (04/07/24 0720)  BP: 115/68 (04/07/24 0720)  SpO2: 98 % (04/07/24 0720) Vital Signs (24h  Range):  Temp:  [97.4 °F (36.3 °C)-98.3 °F (36.8 °C)] 97.6 °F (36.4 °C)  Pulse:  [53-74] 54  Resp:  [14-19] 18  SpO2:  [94 %-99 %] 98 %  BP: (115-160)/(58-90) 115/68     Weight: 58.2 kg (128 lb 4.9 oz)  Body mass index is 22.73 kg/m².     SpO2: 98 %         Intake/Output Summary (Last 24 hours) at 4/7/2024 1048  Last data filed at 4/6/2024 2100  Gross per 24 hour   Intake 240 ml   Output --   Net 240 ml       Physical Exam  Constitutional:       General: She is not in acute distress.  HENT:      Head: Normocephalic and atraumatic.      Nose: No rhinorrhea.      Mouth/Throat:      Mouth: Mucous membranes are moist.   Eyes:      Extraocular Movements: Extraocular movements intact.      Pupils: Pupils are equal, round, and reactive to light.   Cardiovascular:      Rate and Rhythm: Normal rate, no gallop, soft systolic murmur left sternal border, no JVD  Pulmonary:      Effort: Pulmonary effort is normal.      Breath sounds: Normal breath sounds.   Abdominal:      Palpations: Abdomen is soft.      Tenderness: There is no abdominal tenderness.   Musculoskeletal:      Cervical back: Normal range of motion and neck supple.      Right lower leg: No edema.      Left lower leg: No edema.   Skin:     General: Skin is warm and dry.   Neurological:      General: No focal deficit present.      Mental Status: She is alert and oriented to person, place, and time.   Psychiatric:         Behavior: Behavior normal.          Significant Labs: CMP   Recent Labs   Lab 04/05/24  1201 04/06/24  0607 04/07/24  0510   * 129* 131*   K 3.5 3.7 4.0   CO2 30 33* 27   BUN 10.0 9.0 11.0   CREATININE 0.82 0.91 0.83   CALCIUM 9.6 9.9 9.2    and CBC   Recent Labs   Lab 04/06/24  0607   WBC 4.84   HGB 12.3   HCT 34.4*          Troponin negative ×2  ProBNP level was 196  Chest x-ray no active lobar infiltrate, cardiac size normal  EKG-sinus incomplete right bundle branch block             Assessment and Plan:         Chest pain  Personal  history of atrial fibrillation, atrial flutter-prior ablation ×2, recent ablation within a month  Chronic oral anticoagulation  Intermittent bradycardia   Hypothyroid-TSH 0.7  Hyponatremia-improved 115->  127  Mild hypokalemia  Hypertensive urgency  Essential hypertension      Benicar resumed - BP controlled  HR much improve - amiodarone , metoprolol held  Sodium 131  No problem last night       Amiodarone, metoprolol held   Continue daily Eliquis, K supplement  D/C meds reviewed with patient   Primary team corrected hyponatremia  Post discharge follow up with her Cardiologist Dr. Barbosa      Appreciate Dr Washington's help / Consult             Salty Singleton MD  Cardiology  [facility]

## 2024-04-11 ENCOUNTER — PATIENT OUTREACH (OUTPATIENT)
Dept: ADMINISTRATIVE | Facility: CLINIC | Age: 74
End: 2024-04-11
Payer: MEDICARE

## 2024-04-11 NOTE — PROGRESS NOTES
C3 nurse attempted to contact Mirna [place]row for a TCC post hospital discharge follow up call. No answer. The patient does not have a scheduled HOSFU appointment, and the pt does not have an [facility] PCP.

## 2024-04-12 NOTE — PROGRESS NOTES
C3 nurse spoke with Mirna [place]row for a TCC post hospital discharge follow up call. The patient had a scheduled [facility] appointment with Teresa Valle NP on 4/8/24.  The patient stated she was also started on Lisinopril 20mg daily.  Medication unable to be reconciled as it is not on the patient's medication list.

## 2024-09-27 DIAGNOSIS — I10 ESSENTIAL HYPERTENSION, MALIGNANT: Primary | ICD-10-CM

## 2024-10-01 ENCOUNTER — HOSPITAL ENCOUNTER (OUTPATIENT)
Dept: RADIOLOGY | Facility: HOSPITAL | Age: 74
Discharge: HOME | End: 2024-10-01
Attending: INTERNAL MEDICINE
Payer: MEDICARE

## 2024-10-01 DIAGNOSIS — I10 ESSENTIAL HYPERTENSION, MALIGNANT: ICD-10-CM

## 2024-10-01 PROCEDURE — 76770 US EXAM ABDO BACK WALL COMP: CPT | Mod: TC
